# Patient Record
Sex: FEMALE | Race: WHITE | NOT HISPANIC OR LATINO | Employment: UNEMPLOYED | URBAN - METROPOLITAN AREA
[De-identification: names, ages, dates, MRNs, and addresses within clinical notes are randomized per-mention and may not be internally consistent; named-entity substitution may affect disease eponyms.]

---

## 2017-09-22 ENCOUNTER — APPOINTMENT (OUTPATIENT)
Dept: PREADMISSION TESTING | Facility: HOSPITAL | Age: 57
End: 2017-09-22

## 2018-11-27 ENCOUNTER — HOSPITAL ENCOUNTER (EMERGENCY)
Facility: HOSPITAL | Age: 58
Discharge: LEFT BEFORE DISCHARGE | End: 2018-11-27
Attending: EMERGENCY MEDICINE | Admitting: EMERGENCY MEDICINE

## 2018-11-27 ENCOUNTER — APPOINTMENT (OUTPATIENT)
Dept: GENERAL RADIOLOGY | Facility: HOSPITAL | Age: 58
End: 2018-11-27

## 2018-11-27 ENCOUNTER — APPOINTMENT (OUTPATIENT)
Dept: CT IMAGING | Facility: HOSPITAL | Age: 58
End: 2018-11-27

## 2018-11-27 VITALS
OXYGEN SATURATION: 92 % | HEART RATE: 96 BPM | TEMPERATURE: 97.8 F | RESPIRATION RATE: 19 BRPM | SYSTOLIC BLOOD PRESSURE: 124 MMHG | WEIGHT: 210 LBS | BODY MASS INDEX: 34.99 KG/M2 | DIASTOLIC BLOOD PRESSURE: 68 MMHG | HEIGHT: 65 IN

## 2018-11-27 DIAGNOSIS — R07.9 CHEST PAIN, UNSPECIFIED TYPE: Primary | ICD-10-CM

## 2018-11-27 LAB
ALBUMIN SERPL-MCNC: 4.4 G/DL (ref 3.5–5.2)
ALBUMIN/GLOB SERPL: 1.3 G/DL
ALP SERPL-CCNC: 111 U/L (ref 39–117)
ALT SERPL W P-5'-P-CCNC: 19 U/L (ref 1–33)
ANION GAP SERPL CALCULATED.3IONS-SCNC: 9.5 MMOL/L
AST SERPL-CCNC: 21 U/L (ref 1–32)
BASOPHILS # BLD AUTO: 0.04 10*3/MM3 (ref 0–0.2)
BASOPHILS NFR BLD AUTO: 0.4 % (ref 0–1.5)
BILIRUB SERPL-MCNC: 0.3 MG/DL (ref 0.1–1.2)
BUN BLD-MCNC: 9 MG/DL (ref 6–20)
BUN/CREAT SERPL: 11.7 (ref 7–25)
CALCIUM SPEC-SCNC: 9.5 MG/DL (ref 8.6–10.5)
CHLORIDE SERPL-SCNC: 98 MMOL/L (ref 98–107)
CO2 SERPL-SCNC: 30.5 MMOL/L (ref 22–29)
CREAT BLD-MCNC: 0.77 MG/DL (ref 0.57–1)
DEPRECATED RDW RBC AUTO: 50.5 FL (ref 37–54)
EOSINOPHIL # BLD AUTO: 0.28 10*3/MM3 (ref 0–0.7)
EOSINOPHIL NFR BLD AUTO: 2.5 % (ref 0.3–6.2)
ERYTHROCYTE [DISTWIDTH] IN BLOOD BY AUTOMATED COUNT: 13.6 % (ref 11.7–13)
GFR SERPL CREATININE-BSD FRML MDRD: 77 ML/MIN/1.73
GLOBULIN UR ELPH-MCNC: 3.4 GM/DL
GLUCOSE BLD-MCNC: 105 MG/DL (ref 65–99)
HCT VFR BLD AUTO: 47.2 % (ref 35.6–45.5)
HGB BLD-MCNC: 15 G/DL (ref 11.9–15.5)
IMM GRANULOCYTES # BLD: 0.04 10*3/MM3 (ref 0–0.03)
IMM GRANULOCYTES NFR BLD: 0.4 % (ref 0–0.5)
LYMPHOCYTES # BLD AUTO: 4.87 10*3/MM3 (ref 0.9–4.8)
LYMPHOCYTES NFR BLD AUTO: 44.2 % (ref 19.6–45.3)
MCH RBC QN AUTO: 31.8 PG (ref 26.9–32)
MCHC RBC AUTO-ENTMCNC: 31.8 G/DL (ref 32.4–36.3)
MCV RBC AUTO: 100 FL (ref 80.5–98.2)
MONOCYTES # BLD AUTO: 0.7 10*3/MM3 (ref 0.2–1.2)
MONOCYTES NFR BLD AUTO: 6.4 % (ref 5–12)
NEUTROPHILS # BLD AUTO: 5.08 10*3/MM3 (ref 1.9–8.1)
NEUTROPHILS NFR BLD AUTO: 46.1 % (ref 42.7–76)
NT-PROBNP SERPL-MCNC: 15.3 PG/ML (ref 0–900)
PLATELET # BLD AUTO: 202 10*3/MM3 (ref 140–500)
PMV BLD AUTO: 8.8 FL (ref 6–12)
POTASSIUM BLD-SCNC: 4.4 MMOL/L (ref 3.5–5.2)
PROT SERPL-MCNC: 7.8 G/DL (ref 6–8.5)
RBC # BLD AUTO: 4.72 10*6/MM3 (ref 3.9–5.2)
SODIUM BLD-SCNC: 138 MMOL/L (ref 136–145)
TROPONIN T SERPL-MCNC: <0.01 NG/ML (ref 0–0.03)
TROPONIN T SERPL-MCNC: <0.01 NG/ML (ref 0–0.03)
WBC NRBC COR # BLD: 11.01 10*3/MM3 (ref 4.5–10.7)

## 2018-11-27 PROCEDURE — 99284 EMERGENCY DEPT VISIT MOD MDM: CPT

## 2018-11-27 PROCEDURE — 83880 ASSAY OF NATRIURETIC PEPTIDE: CPT | Performed by: NURSE PRACTITIONER

## 2018-11-27 PROCEDURE — 80053 COMPREHEN METABOLIC PANEL: CPT | Performed by: NURSE PRACTITIONER

## 2018-11-27 PROCEDURE — 94640 AIRWAY INHALATION TREATMENT: CPT

## 2018-11-27 PROCEDURE — 93005 ELECTROCARDIOGRAM TRACING: CPT | Performed by: EMERGENCY MEDICINE

## 2018-11-27 PROCEDURE — 93005 ELECTROCARDIOGRAM TRACING: CPT

## 2018-11-27 PROCEDURE — 84484 ASSAY OF TROPONIN QUANT: CPT | Performed by: NURSE PRACTITIONER

## 2018-11-27 PROCEDURE — 71045 X-RAY EXAM CHEST 1 VIEW: CPT

## 2018-11-27 PROCEDURE — 93010 ELECTROCARDIOGRAM REPORT: CPT | Performed by: INTERNAL MEDICINE

## 2018-11-27 PROCEDURE — 85025 COMPLETE CBC W/AUTO DIFF WBC: CPT | Performed by: NURSE PRACTITIONER

## 2018-11-27 RX ORDER — HYDROCODONE BITARTRATE AND ACETAMINOPHEN 10; 325 MG/1; MG/1
10-325 TABLET ORAL EVERY 6 HOURS SCHEDULED
COMMUNITY

## 2018-11-27 RX ORDER — CLONAZEPAM 0.5 MG/1
TABLET ORAL
COMMUNITY

## 2018-11-27 RX ORDER — ALBUTEROL SULFATE 90 UG/1
2 AEROSOL, METERED RESPIRATORY (INHALATION)
COMMUNITY
Start: 2018-06-11

## 2018-11-27 RX ORDER — ALBUTEROL SULFATE 2.5 MG/3ML
SOLUTION RESPIRATORY (INHALATION)
COMMUNITY
Start: 2018-06-11

## 2018-11-27 RX ORDER — IPRATROPIUM BROMIDE AND ALBUTEROL SULFATE 2.5; .5 MG/3ML; MG/3ML
3 SOLUTION RESPIRATORY (INHALATION) ONCE
Status: COMPLETED | OUTPATIENT
Start: 2018-11-27 | End: 2018-11-27

## 2018-11-27 RX ORDER — BUDESONIDE AND FORMOTEROL FUMARATE DIHYDRATE 160; 4.5 UG/1; UG/1
2 AEROSOL RESPIRATORY (INHALATION)
COMMUNITY
Start: 2018-11-13 | End: 2019-11-13

## 2018-11-27 RX ORDER — CYCLOBENZAPRINE HCL 10 MG
10 TABLET ORAL EVERY 8 HOURS SCHEDULED
COMMUNITY
Start: 2018-09-28

## 2018-11-27 RX ADMIN — IPRATROPIUM BROMIDE AND ALBUTEROL SULFATE 3 ML: 2.5; .5 SOLUTION RESPIRATORY (INHALATION) at 12:53

## 2018-11-27 NOTE — ED PROVIDER NOTES
"EMERGENCY DEPARTMENT ENCOUNTER    CHIEF COMPLAINT  Chief Complaint: Chest Pain  History given by: Patient  History limited by: N/A  Room Number: 26/26  PMD: System, Provider Not In      HPI:  Pt is a 58 y.o. female with h/o chronic back pain for which pt is followed by Pain Management. Pt reports that earlier today, while pt was in the waiting room of her Pain Management physician's office (for an appointment) reading a book, pt developed constant \"sharp\" left-sided chest pain. Pt reports that she has taken NTG with some relief of the chest pain. Pt reports that her chest pain has now resolved. Pt reports that during its onset, pt's chest pain did not worsen with exertion, movement, or inspiration. Pt reports that she has also had mild nausea and mild dyspnea, but denies palpitations, diaphoresis, vomiting, abdominal pain, bilateral/unilateral calf pain, BLE edema, and dizziness/lightheadedness. Pt states that she had a similar episode of chest pain several months ago for which pt underwent an outpatient stress test (pt is unable to recall the results of the stress test). There are no other complaints at this time.       Duration: Onset earlier today  Timing: Constant  Location: Left side of the chest  Radiation: None  Quality: \"sharp\"  Intensity/Severity: Moderate  Progression: Resolved  Associated Symptoms: Dyspnea, nausea  Aggravating Factors: Nothing  Alleviating Factors: NTG  Previous Episodes: Once several months ago  Treatment before arrival: NTG        PAST MEDICAL HISTORY  Active Ambulatory Problems     Diagnosis Date Noted   • No Active Ambulatory Problems     Resolved Ambulatory Problems     Diagnosis Date Noted   • No Resolved Ambulatory Problems     Past Medical History:   Diagnosis Date   • Back pain    • Chronic pain    • COPD (chronic obstructive pulmonary disease) (CMS/[facility])    • Depression    • Hyperlipidemia    • Injury of back        PAST SURGICAL HISTORY  Past Surgical History:   Procedure " Laterality Date   • APPENDECTOMY     • BREAST SURGERY     • HYSTERECTOMY     • NECK SURGERY     • TONSILLECTOMY     • TUBAL ABDOMINAL LIGATION         FAMILY HISTORY  History reviewed. No pertinent family history.    SOCIAL HISTORY  Social History     Socioeconomic History   • Marital status:      Spouse name: Not on file   • Number of children: Not on file   • Years of education: Not on file   • Highest education level: Not on file   Social Needs   • Financial resource strain: Not on file   • Food insecurity - worry: Not on file   • Food insecurity - inability: Not on file   • Transportation needs - medical: Not on file   • Transportation needs - non-medical: Not on file   Occupational History   • Not on file   Tobacco Use   • Smoking status: Current Every Day Smoker     Packs/day: 1.00     Types: Cigarettes   Substance and Sexual Activity   • Alcohol use: No     Frequency: Never   • Drug use: No   • Sexual activity: Defer   Other Topics Concern   • Not on file   Social History Narrative   • Not on file         ALLERGIES  Patient has no known allergies.        REVIEW OF SYSTEMS  Review of Systems   Constitutional: Negative for chills and diaphoresis.   HENT: Negative for congestion, rhinorrhea and sore throat.    Eyes: Negative for pain.   Respiratory: Positive for shortness of breath. Negative for cough.    Cardiovascular: Positive for chest pain. Negative for palpitations and leg swelling.   Gastrointestinal: Positive for nausea. Negative for abdominal pain, diarrhea and vomiting.   Genitourinary: Negative for difficulty urinating, dysuria, flank pain and frequency.   Musculoskeletal: Positive for back pain (chronic for pt). Negative for myalgias, neck pain and neck stiffness.   Skin: Negative for rash.   Neurological: Negative for dizziness, speech difficulty, weakness, light-headedness, numbness and headaches.   Psychiatric/Behavioral: Negative.    All other systems reviewed and are  negative.          PHYSICAL EXAM  ED Triage Vitals   Temp Heart Rate Resp BP SpO2   11/27/18 1206 11/27/18 1206 11/27/18 1209 11/27/18 1206 11/27/18 1206   97.8 °F (36.6 °C) 82 16 121/66 94 %      Temp src Heart Rate Source Patient Position BP Location FiO2 (%)   11/27/18 1206 -- -- -- --   Oral           Physical Exam   Constitutional: She is oriented to person, place, and time. No distress.   HENT:   Head: Normocephalic.   Mouth/Throat: Mucous membranes are normal.   Eyes: EOM are normal. Pupils are equal, round, and reactive to light.   Neck: Normal range of motion. Neck supple.   Cardiovascular: Normal rate, regular rhythm and normal heart sounds.   Pulmonary/Chest: Effort normal. No respiratory distress. She has decreased breath sounds (bilaterally). She has no wheezes. She has no rhonchi. She has no rales.   Abdominal: Soft. There is no tenderness. There is no rebound and no guarding.   Musculoskeletal: Normal range of motion.   Neurological: She is alert and oriented to person, place, and time. She has normal sensation.   Skin: Skin is warm and dry.   Psychiatric: Mood and affect normal.   Nursing note and vitals reviewed.          LAB RESULTS  Recent Results (from the past 24 hour(s))   Comprehensive Metabolic Panel    Collection Time: 11/27/18 12:33 PM   Result Value Ref Range    Glucose 105 (H) 65 - 99 mg/dL    BUN 9 6 - 20 mg/dL    Creatinine 0.77 0.57 - 1.00 mg/dL    Sodium 138 136 - 145 mmol/L    Potassium 4.4 3.5 - 5.2 mmol/L    Chloride 98 98 - 107 mmol/L    CO2 30.5 (H) 22.0 - 29.0 mmol/L    Calcium 9.5 8.6 - 10.5 mg/dL    Total Protein 7.8 6.0 - 8.5 g/dL    Albumin 4.40 3.50 - 5.20 g/dL    ALT (SGPT) 19 1 - 33 U/L    AST (SGOT) 21 1 - 32 U/L    Alkaline Phosphatase 111 39 - 117 U/L    Total Bilirubin 0.3 0.1 - 1.2 mg/dL    eGFR Non African Amer 77 >60 mL/min/1.73    Globulin 3.4 gm/dL    A/G Ratio 1.3 g/dL    BUN/Creatinine Ratio 11.7 7.0 - 25.0    Anion Gap 9.5 mmol/L   Troponin    Collection Time:  11/27/18 12:33 PM   Result Value Ref Range    Troponin T <0.010 0.000 - 0.030 ng/mL   BNP    Collection Time: 11/27/18 12:33 PM   Result Value Ref Range    proBNP 15.3 0.0 - 900.0 pg/mL   CBC Auto Differential    Collection Time: 11/27/18 12:33 PM   Result Value Ref Range    WBC 11.01 (H) 4.50 - 10.70 10*3/mm3    RBC 4.72 3.90 - 5.20 10*6/mm3    Hemoglobin 15.0 11.9 - 15.5 g/dL    Hematocrit 47.2 (H) 35.6 - 45.5 %    .0 (H) 80.5 - 98.2 fL    MCH 31.8 26.9 - 32.0 pg    MCHC 31.8 (L) 32.4 - 36.3 g/dL    RDW 13.6 (H) 11.7 - 13.0 %    RDW-SD 50.5 37.0 - 54.0 fl    MPV 8.8 6.0 - 12.0 fL    Platelets 202 140 - 500 10*3/mm3    Neutrophil % 46.1 42.7 - 76.0 %    Lymphocyte % 44.2 19.6 - 45.3 %    Monocyte % 6.4 5.0 - 12.0 %    Eosinophil % 2.5 0.3 - 6.2 %    Basophil % 0.4 0.0 - 1.5 %    Immature Grans % 0.4 0.0 - 0.5 %    Neutrophils, Absolute 5.08 1.90 - 8.10 10*3/mm3    Lymphocytes, Absolute 4.87 (H) 0.90 - 4.80 10*3/mm3    Monocytes, Absolute 0.70 0.20 - 1.20 10*3/mm3    Eosinophils, Absolute 0.28 0.00 - 0.70 10*3/mm3    Basophils, Absolute 0.04 0.00 - 0.20 10*3/mm3    Immature Grans, Absolute 0.04 (H) 0.00 - 0.03 10*3/mm3   Troponin    Collection Time: 11/27/18  2:07 PM   Result Value Ref Range    Troponin T <0.010 0.000 - 0.030 ng/mL       I ordered the above labs and reviewed the results.        RADIOLOGY  XR Chest 1 View   Final Result    FINDINGS: The lungs are well-expanded and clear and the heart and hilar  structures appear normal. There is no acute disease.      CT Angiogram Chest With Contrast    (Results Pending)       I ordered the above noted radiological studies and reviewed the images on the PACS system.        EKG    EKG was interpreted by Dr. Santana (ER physician). Please see his note for documentation.         MEDICAL RECORD REVIEW    Pt had an LLE venous doppler performed in 08/2018 that showed:  Conclusions: Left lower extremity with no evidence of deep or superficial vein thrombus.             Pt had a stress test performed in 07/2018 at [facility] that showed:  Summary    1. No evidence of stress induced myocardial ischemia or infarction is    visualized.    2. Normal LV perfusion is visualized.        Summary of LV Function    1. Gated SPECT analysis demonstrates a post stress ejection fraction of    78%.    2. Normal LV systolic function is visualized.        Risk Stratification    1. This is a normal Lexiscan nuclear stress test.    2. This is a low risk study.          PROGRESS AND CONSULTS    12:28 PM:  Blood work and CXR ordered for further evaluation. Duo-neb ordered to treat for pt's dyspnea.     1:22 PM:  CTA Chest ordered for further evaluation.     1:23 PM:  Reviewed pt's history and workup with Dr. Santana (ER physician). After a bedside evaluation, Dr. Santana agrees with the plan of care.    2:00 PM:  Repeat troponin ordered for further evaluation.     2:35 PM:  Per Dr. Santana, pt would like to leave Against Medical Advice. Dr. Santana informed pt of the risks of leaving prior to completion of evaluation. Pt verbally agreed to accept these risks, including worsening of condition, complications, and death. Pt was of sound mind and mentally stable to make decisions at the time of her AMA departure. Dr. Santana strongly encouraged pt to return to the ED at any time if symptoms persist or for any additional concerns. Strict RTER warnings given. Pt was strongly advised to f/u with her PMD.     Pt's CTA Chest results are pending.         COURSE & MEDICAL DECISION MAKING  Pertinent Labs and/or imaging studies that were ordered and reviewed are noted above. Results were reviewed/discussed with the patient and they were also made aware of online assess. Pt also made aware that some labs, such as cultures, will not be resulted during ER visit and follow up with PMD is necessary.         MEDICATIONS GIVEN IN ER  Medications   ipratropium-albuterol (DUO-NEB) nebulizer solution 3 mL (3 mL  "Nebulization Given 11/27/18 1253)             DISPOSITION VITALS  /68 (BP Location: Left arm, Patient Position: Lying)   Pulse 96   Temp 97.8 °F (36.6 °C) (Oral)   Resp 19   Ht 165.1 cm (65\")   Wt 95.3 kg (210 lb)   SpO2 92%   BMI 34.95 kg/m²           DIAGNOSIS  Final diagnoses:   Chest pain, unspecified type             DISPOSITION  Pt will leave AMA.       FOLLOW-UP  System, Provider Not In  [place] [id number]      Call for Appointment          I personally reviewed the past medical history, past surgical history, social history, family history, current medications and allergies as they appear in this chart.  The scribe's note accurately reflects the work and decisions made by me.     Documentation assistance provided by kortney Anne for BEVERLY Xiao.  Information recorded by the scribe was done at my direction and has been verified and validated by me.              David Anne  11/27/18 1440       Shannon Burnham APRN  11/27/18 1511    "

## 2018-11-27 NOTE — ED PROVIDER NOTES
Pt presents to the ED c/o intermittent, 15-20 minute episodes of left anterior chest pain which radiates to her back.  Pt reports her pain today began at her Pain Management office while at rest.  Pt reports hx of similar chest pain and recent outpatient stress test.  Pt denies current CP.    On exam, pt's heart is RRR and lungs are CTAB.     EKG          EKG time: 1214  Rhythm/Rate: SR 81  P waves and KS: normal  QRS, axis: normal   ST and T waves: normal     Interpreted Contemporaneously by me, independently viewed  No prior to compare.    Initial troponin negative. Plan for repeat troponin and await CTA Chest.    1428- Pt wanting to return to Pain Management to get her prescription filled. Discussed with pt since her pain is intermittent and ongoing and initial troponin is negative, there is low suspicion, but cannot completely r/o MI and PE.  Pt verbalizes understandings of risks of MI, PE, and death, and will sign out AMA.    MD ATTESTATION NOTE    The NAVID and I have discussed this patient's history, physical exam, and treatment plan.  I have reviewed the documentation and personally had a face to face interaction with the patient. I affirm the documentation and agree with the treatment and plan.  The attached note describes my personal findings.    Documentation assistance provided by kortney Reeder for Dr. Santana. Information recorded by the kortney was done at my direction and has been verified and validated by me.          Kelin Reeder  11/27/18 1439       Rafael Santana MD  11/27/18 1443

## 2018-11-27 NOTE — ED NOTES
Pt was at a F/U doctor's appointment today for back pain. At the office she began having left sided CP that radiated into her epigastric region. Pt has had these episodes in the past. She has been cardiac cleared.      Tiffani Saldana RN  11/27/18 [id number]

## 2020-07-13 ENCOUNTER — TRANSCRIBE ORDERS (OUTPATIENT)
Dept: PHYSICAL THERAPY | Facility: CLINIC | Age: 60
End: 2020-07-13

## 2020-07-13 DIAGNOSIS — M54.16 LUMBAR RADICULOPATHY: Primary | ICD-10-CM

## 2023-03-16 ENCOUNTER — OFFICE (AMBULATORY)
Dept: URBAN - METROPOLITAN AREA CLINIC 76 | Facility: CLINIC | Age: 63
End: 2023-03-16

## 2023-03-16 VITALS
HEIGHT: 60 IN | SYSTOLIC BLOOD PRESSURE: 123 MMHG | WEIGHT: 175 LBS | DIASTOLIC BLOOD PRESSURE: 74 MMHG | HEART RATE: 91 BPM

## 2023-03-16 DIAGNOSIS — R11.0 NAUSEA: ICD-10-CM

## 2023-03-16 DIAGNOSIS — R13.10 DYSPHAGIA, UNSPECIFIED: ICD-10-CM

## 2023-03-16 DIAGNOSIS — K59.03 DRUG INDUCED CONSTIPATION: ICD-10-CM

## 2023-03-16 DIAGNOSIS — K21.9 GASTRO-ESOPHAGEAL REFLUX DISEASE WITHOUT ESOPHAGITIS: ICD-10-CM

## 2023-03-16 DIAGNOSIS — Z86.010 PERSONAL HISTORY OF COLONIC POLYPS: ICD-10-CM

## 2023-03-16 PROCEDURE — 99204 OFFICE O/P NEW MOD 45 MIN: CPT | Performed by: INTERNAL MEDICINE

## 2023-03-16 RX ORDER — NALOXEGOL OXALATE 12.5 MG/1
12.5 TABLET, FILM COATED ORAL
Qty: 90 | Refills: 0 | Status: COMPLETED
Start: 2023-03-16 | End: 2023-04-25

## 2023-04-20 VITALS
RESPIRATION RATE: 18 BRPM | DIASTOLIC BLOOD PRESSURE: 66 MMHG | OXYGEN SATURATION: 92 % | TEMPERATURE: 97.6 F | DIASTOLIC BLOOD PRESSURE: 54 MMHG | RESPIRATION RATE: 15 BRPM | WEIGHT: 168 LBS | RESPIRATION RATE: 8 BRPM | RESPIRATION RATE: 14 BRPM | DIASTOLIC BLOOD PRESSURE: 52 MMHG | DIASTOLIC BLOOD PRESSURE: 46 MMHG | HEART RATE: 69 BPM | SYSTOLIC BLOOD PRESSURE: 108 MMHG | TEMPERATURE: 97.9 F | RESPIRATION RATE: 6 BRPM | HEART RATE: 82 BPM | RESPIRATION RATE: 17 BRPM | TEMPERATURE: 97.8 F | SYSTOLIC BLOOD PRESSURE: 109 MMHG | OXYGEN SATURATION: 93 % | OXYGEN SATURATION: 95 % | OXYGEN SATURATION: 96 % | HEART RATE: 73 BPM | OXYGEN SATURATION: 90 % | DIASTOLIC BLOOD PRESSURE: 37 MMHG | SYSTOLIC BLOOD PRESSURE: 97 MMHG | HEART RATE: 68 BPM | RESPIRATION RATE: 7 BRPM | DIASTOLIC BLOOD PRESSURE: 58 MMHG | SYSTOLIC BLOOD PRESSURE: 120 MMHG | RESPIRATION RATE: 19 BRPM | HEART RATE: 77 BPM | HEIGHT: 60 IN | HEART RATE: 71 BPM | DIASTOLIC BLOOD PRESSURE: 41 MMHG | OXYGEN SATURATION: 94 % | DIASTOLIC BLOOD PRESSURE: 72 MMHG | SYSTOLIC BLOOD PRESSURE: 99 MMHG | SYSTOLIC BLOOD PRESSURE: 121 MMHG | SYSTOLIC BLOOD PRESSURE: 100 MMHG | SYSTOLIC BLOOD PRESSURE: 95 MMHG

## 2023-04-25 ENCOUNTER — OFFICE (AMBULATORY)
Dept: URBAN - METROPOLITAN AREA PATHOLOGY 4 | Facility: PATHOLOGY | Age: 63
End: 2023-04-25
Payer: MEDICAID

## 2023-04-25 ENCOUNTER — AMBULATORY SURGICAL CENTER (AMBULATORY)
Dept: URBAN - METROPOLITAN AREA SURGERY 17 | Facility: SURGERY | Age: 63
End: 2023-04-25

## 2023-04-25 DIAGNOSIS — K29.80 DUODENITIS WITHOUT BLEEDING: ICD-10-CM

## 2023-04-25 DIAGNOSIS — K29.70 GASTRITIS, UNSPECIFIED, WITHOUT BLEEDING: ICD-10-CM

## 2023-04-25 DIAGNOSIS — K20.80 OTHER ESOPHAGITIS WITHOUT BLEEDING: ICD-10-CM

## 2023-04-25 DIAGNOSIS — K20.90 ESOPHAGITIS, UNSPECIFIED WITHOUT BLEEDING: ICD-10-CM

## 2023-04-25 DIAGNOSIS — R13.10 DYSPHAGIA, UNSPECIFIED: ICD-10-CM

## 2023-04-25 DIAGNOSIS — K31.89 OTHER DISEASES OF STOMACH AND DUODENUM: ICD-10-CM

## 2023-04-25 LAB
GI HISTOLOGY: A. UNSPECIFIED: (no result)
GI HISTOLOGY: B. UNSPECIFIED: (no result)
GI HISTOLOGY: C. SELECT: (no result)
GI HISTOLOGY: D. SELECT: (no result)
GI HISTOLOGY: E. SELECT: (no result)
GI HISTOLOGY: F. SELECT: (no result)
GI HISTOLOGY: PDF REPORT: (no result)

## 2023-04-25 PROCEDURE — 43239 EGD BIOPSY SINGLE/MULTIPLE: CPT | Performed by: INTERNAL MEDICINE

## 2023-04-25 PROCEDURE — 88342 IMHCHEM/IMCYTCHM 1ST ANTB: CPT | Performed by: INTERNAL MEDICINE

## 2023-04-25 PROCEDURE — 88312 SPECIAL STAINS GROUP 1: CPT | Performed by: INTERNAL MEDICINE

## 2023-04-25 PROCEDURE — 43450 DILATE ESOPHAGUS 1/MULT PASS: CPT | Performed by: INTERNAL MEDICINE

## 2023-04-25 PROCEDURE — 88305 TISSUE EXAM BY PATHOLOGIST: CPT | Performed by: INTERNAL MEDICINE

## 2023-09-19 ENCOUNTER — OFFICE (AMBULATORY)
Dept: URBAN - METROPOLITAN AREA CLINIC 76 | Facility: CLINIC | Age: 63
End: 2023-09-19
Payer: MEDICAID

## 2023-09-19 VITALS
HEART RATE: 79 BPM | WEIGHT: 176 LBS | SYSTOLIC BLOOD PRESSURE: 118 MMHG | HEIGHT: 60 IN | DIASTOLIC BLOOD PRESSURE: 62 MMHG | OXYGEN SATURATION: 91 %

## 2023-09-19 DIAGNOSIS — K21.9 GASTRO-ESOPHAGEAL REFLUX DISEASE WITHOUT ESOPHAGITIS: ICD-10-CM

## 2023-09-19 DIAGNOSIS — K58.1 IRRITABLE BOWEL SYNDROME WITH CONSTIPATION: ICD-10-CM

## 2023-09-19 DIAGNOSIS — R11.0 NAUSEA: ICD-10-CM

## 2023-09-19 DIAGNOSIS — R13.10 DYSPHAGIA, UNSPECIFIED: ICD-10-CM

## 2023-09-19 PROCEDURE — 99214 OFFICE O/P EST MOD 30 MIN: CPT

## 2023-09-19 RX ORDER — PLECANATIDE 3 MG/1
3 TABLET ORAL
Qty: 90 | Refills: 3 | Status: ACTIVE
Start: 2023-09-19

## 2023-09-19 RX ORDER — OMEPRAZOLE 40 MG/1
80 CAPSULE, DELAYED RELEASE ORAL
Qty: 180 | Refills: 4 | Status: COMPLETED
Start: 2023-09-19 | End: 2024-04-03

## 2024-01-02 ENCOUNTER — OFFICE (AMBULATORY)
Dept: URBAN - METROPOLITAN AREA CLINIC 76 | Facility: CLINIC | Age: 64
End: 2024-01-02
Payer: MEDICAID

## 2024-01-02 VITALS
WEIGHT: 180 LBS | HEART RATE: 80 BPM | SYSTOLIC BLOOD PRESSURE: 123 MMHG | DIASTOLIC BLOOD PRESSURE: 76 MMHG | HEIGHT: 60 IN

## 2024-01-02 DIAGNOSIS — K58.1 IRRITABLE BOWEL SYNDROME WITH CONSTIPATION: ICD-10-CM

## 2024-01-02 DIAGNOSIS — R13.10 DYSPHAGIA, UNSPECIFIED: ICD-10-CM

## 2024-01-02 DIAGNOSIS — K21.9 GASTRO-ESOPHAGEAL REFLUX DISEASE WITHOUT ESOPHAGITIS: ICD-10-CM

## 2024-01-02 DIAGNOSIS — R11.0 NAUSEA: ICD-10-CM

## 2024-01-02 DIAGNOSIS — R14.0 ABDOMINAL DISTENSION (GASEOUS): ICD-10-CM

## 2024-01-02 PROCEDURE — 99214 OFFICE O/P EST MOD 30 MIN: CPT

## 2024-01-02 RX ORDER — PANTOPRAZOLE SODIUM 40 MG/1
80 TABLET, DELAYED RELEASE ORAL
Qty: 180 | Refills: 3 | Status: ACTIVE
Start: 2024-01-02

## 2024-04-03 ENCOUNTER — OFFICE (AMBULATORY)
Dept: URBAN - METROPOLITAN AREA CLINIC 76 | Facility: CLINIC | Age: 64
End: 2024-04-03
Payer: MEDICAID

## 2024-04-03 VITALS
DIASTOLIC BLOOD PRESSURE: 67 MMHG | HEART RATE: 93 BPM | SYSTOLIC BLOOD PRESSURE: 113 MMHG | HEIGHT: 60 IN | WEIGHT: 177 LBS | OXYGEN SATURATION: 97 %

## 2024-04-03 DIAGNOSIS — R19.7 DIARRHEA, UNSPECIFIED: ICD-10-CM

## 2024-04-03 DIAGNOSIS — R10.9 UNSPECIFIED ABDOMINAL PAIN: ICD-10-CM

## 2024-04-03 DIAGNOSIS — K21.9 GASTRO-ESOPHAGEAL REFLUX DISEASE WITHOUT ESOPHAGITIS: ICD-10-CM

## 2024-04-03 DIAGNOSIS — R14.0 ABDOMINAL DISTENSION (GASEOUS): ICD-10-CM

## 2024-04-03 DIAGNOSIS — R13.10 DYSPHAGIA, UNSPECIFIED: ICD-10-CM

## 2024-04-03 PROCEDURE — 99214 OFFICE O/P EST MOD 30 MIN: CPT

## 2024-04-03 RX ORDER — ESOMEPRAZOLE MAGNESIUM 40 MG/1
80 CAPSULE, DELAYED RELEASE ORAL
Qty: 180 | Refills: 3 | Status: ACTIVE
Start: 2024-04-03

## 2024-04-03 RX ORDER — DICYCLOMINE HYDROCHLORIDE 10 MG/1
40 CAPSULE ORAL
Qty: 60 | Refills: 11 | Status: ACTIVE
Start: 2024-04-03

## 2024-04-17 PROBLEM — T40.2X5A THERAPEUTIC OPIOID INDUCED CONSTIPATION: Status: ACTIVE | Noted: 2024-04-17

## 2024-04-17 PROBLEM — K21.9 GERD (GASTROESOPHAGEAL REFLUX DISEASE): Status: ACTIVE | Noted: 2024-04-17

## 2024-04-17 PROBLEM — K59.03 THERAPEUTIC OPIOID INDUCED CONSTIPATION: Status: ACTIVE | Noted: 2024-04-17

## 2024-04-18 ENCOUNTER — OFFICE VISIT (OUTPATIENT)
Age: 64
End: 2024-04-18
Payer: MEDICARE

## 2024-04-18 ENCOUNTER — TELEPHONE (OUTPATIENT)
Dept: GASTROENTEROLOGY | Facility: CLINIC | Age: 64
End: 2024-04-18
Payer: MEDICARE

## 2024-04-18 VITALS — SYSTOLIC BLOOD PRESSURE: 130 MMHG | BODY MASS INDEX: 40.04 KG/M2 | HEIGHT: 60 IN | DIASTOLIC BLOOD PRESSURE: 80 MMHG

## 2024-04-18 DIAGNOSIS — R11.2 NAUSEA AND VOMITING, UNSPECIFIED VOMITING TYPE: ICD-10-CM

## 2024-04-18 DIAGNOSIS — K21.9 GASTROESOPHAGEAL REFLUX DISEASE, UNSPECIFIED WHETHER ESOPHAGITIS PRESENT: ICD-10-CM

## 2024-04-18 DIAGNOSIS — K52.9 CHRONIC DIARRHEA: ICD-10-CM

## 2024-04-18 DIAGNOSIS — K58.0 IRRITABLE BOWEL SYNDROME WITH DIARRHEA: ICD-10-CM

## 2024-04-18 DIAGNOSIS — R10.13 DYSPEPSIA: Primary | ICD-10-CM

## 2024-04-18 RX ORDER — ONDANSETRON 8 MG/1
8 TABLET, ORALLY DISINTEGRATING ORAL EVERY 8 HOURS PRN
Qty: 90 TABLET | Refills: 11 | Status: SHIPPED | OUTPATIENT
Start: 2024-04-18

## 2024-04-18 RX ORDER — OMEPRAZOLE 40 MG/1
40 CAPSULE, DELAYED RELEASE ORAL
Qty: 180 CAPSULE | Refills: 3 | Status: SHIPPED | OUTPATIENT
Start: 2024-04-18

## 2024-04-18 NOTE — TELEPHONE ENCOUNTER
PA Case: [id number], Status: Approved, Coverage Starts on: 1/18/2024 12:00:00 AM, Coverage Ends on: 4/18/2025 12:00:00 AM.. Authorization Expiration Date: April 18, 2025.    Not eligible for copay card

## 2024-04-18 NOTE — PROGRESS NOTES
Anabelle Nova is a 64 y.o. female with a past medical history of previous Opioid Use DO 2/2 Chronic Pain, GERD + noted below who presents for evaluation of   Chief Complaint   Patient presents with    Heartburn    Constipation    Nausea    Vomiting       Subjective     # Chronic Diarrhea   # Dyspepsia   - Symptoms ongoing for 3 years   - Reports diarrhea occurring anytime she tries to eat.   - Reports anorexia and epigastric discomfort.    - Denies taking any medications  - No previous c/s noted in EMR.   - Took last opioid on 2/20.     # GERD   - Endorses heartburn occurring several days per week.   - Associated with persistent N/V.   - Denies taking any medications currently. Denies NSAID use.                Past Medical History:   Diagnosis Date    Back pain     Chronic pain     COPD (chronic obstructive pulmonary disease)     Depression     Hyperlipidemia     Injury of back          Current Outpatient Medications:     albuterol (PROVENTIL HFA;VENTOLIN HFA) 108 (90 Base) MCG/ACT inhaler, Inhale 2 puffs. (Patient not taking: Reported on 4/18/2024), Disp: , Rfl:     albuterol (PROVENTIL) (2.5 MG/3ML) 0.083% nebulizer solution, albuterol sulfate 2.5 mg/3 mL (0.083 %) solution for nebulization (Patient not taking: Reported on 4/18/2024), Disp: , Rfl:     clonazePAM (KlonoPIN) 0.5 MG tablet, clonazepam 0.5 mg tablet (Patient not taking: Reported on 4/18/2024), Disp: , Rfl:     cyclobenzaprine (FLEXERIL) 10 MG tablet, Take 10 mg by mouth Every 8 (Eight) Hours. (Patient not taking: Reported on 4/18/2024), Disp: , Rfl:     gabapentin (NEURONTIN) 300 MG capsule, , Disp: , Rfl:     GABAPENTIN PO, Take 600 mg by mouth Every Night. (Patient not taking: Reported on 4/18/2024), Disp: , Rfl:     HYDROcodone-acetaminophen (NORCO)  MG per tablet, Take  mg by mouth Every 6 (Six) Hours. (Patient not taking: Reported on 4/18/2024), Disp: , Rfl:     hydrOXYzine pamoate (VISTARIL) 25 MG capsule, hydroxyzine pamoate 25 mg  capsule (Patient not taking: Reported on 4/18/2024), Disp: , Rfl:     linaclotide (Linzess) 290 MCG capsule capsule, Linzess 290 mcg capsule (Patient not taking: Reported on 4/18/2024), Disp: , Rfl:     meloxicam (MOBIC) 15 MG tablet, , Disp: , Rfl:     nystatin (MYCOSTATIN) 100,000 unit/mL suspension, Take 5 mL by mouth 4 (Four) Times a Day. (Patient not taking: Reported on 4/18/2024), Disp: 60 mL, Rfl: 0    omeprazole (priLOSEC) 40 MG capsule, Take 1 capsule by mouth 2 (Two) Times a Day Before Meals., Disp: 180 capsule, Rfl: 3    ondansetron ODT (ZOFRAN-ODT) 8 MG disintegrating tablet, Place 1 tablet on the tongue Every 8 (Eight) Hours As Needed for Nausea or Vomiting., Disp: 90 tablet, Rfl: 11    oxyCODONE-acetaminophen (PERCOCET) 7.5-325 MG per tablet, , Disp: , Rfl:     QUEtiapine (SEROquel) 100 MG tablet, quetiapine 100 mg tablet (Patient not taking: Reported on 4/18/2024), Disp: , Rfl:     Respiratory Therapy Supplies (NEBULIZER COMPRESSOR) kit, 1 each. (Patient not taking: Reported on 4/18/2024), Disp: , Rfl:     riFAXIMin (XIFAXAN) 550 MG tablet, Take 1 tablet by mouth Every 8 (Eight) Hours for 14 days., Disp: 42 tablet, Rfl: 0    sertraline (ZOLOFT) 50 MG tablet, , Disp: , Rfl:     sucralfate (CARAFATE) 1 g tablet, Take 1 tablet by mouth 4 (Four) Times a Day. (Patient not taking: Reported on 4/18/2024), Disp: 30 tablet, Rfl: 0    Allergies   Allergen Reactions    Fluoxetine Other (See Comments)     Homicidal, depressed, confusion  Homicidal, depressed, confusion         Social History     Socioeconomic History    Marital status:    Tobacco Use    Smoking status: Every Day     Current packs/day: 1.00     Types: Cigarettes     Passive exposure: Current    Smokeless tobacco: Never   Vaping Use    Vaping status: Never Used   Substance and Sexual Activity    Alcohol use: No    Drug use: No    Sexual activity: Defer       No family history on file.    Objective     Vitals:    04/18/24 0948   BP: 130/80          04/18/24  0948   Weight: (Patient reported)     Body mass index is 40.04 kg/m².    Physical Exam  Vitals reviewed.   Constitutional:       Appearance: Normal appearance.   HENT:      Head: Normocephalic and atraumatic.   Eyes:      Extraocular Movements: Extraocular movements intact.      Conjunctiva/sclera: Conjunctivae normal.   Cardiovascular:      Rate and Rhythm: Normal rate and regular rhythm.      Heart sounds: Normal heart sounds.   Pulmonary:      Effort: Pulmonary effort is normal.      Breath sounds: Normal breath sounds.   Abdominal:      General: Abdomen is flat. Bowel sounds are normal. There is no distension.      Palpations: Abdomen is soft.      Tenderness: There is abdominal tenderness (mild epigastric).   Neurological:      Mental Status: She is alert.   Psychiatric:         Mood and Affect: Mood normal.         Behavior: Behavior normal.         WBC   Date Value Ref Range Status   09/01/2022 8.99 4.5 - 11.0 10*3/uL Final     RBC   Date Value Ref Range Status   09/01/2022 4.69 4.0 - 5.2 10*6/uL Final     Hemoglobin   Date Value Ref Range Status   09/01/2022 12.9 12.0 - 16.0 g/dL Final     Hematocrit   Date Value Ref Range Status   09/01/2022 42.0 36.0 - 46.0 % Final     MCV   Date Value Ref Range Status   09/01/2022 89.6 80.0 - 100.0 fL Final     MCH   Date Value Ref Range Status   09/01/2022 27.5 26.0 - 34.0 pg Final     MCHC   Date Value Ref Range Status   09/01/2022 30.7 (L) 31.0 - 37.0 g/dL Final     RDW   Date Value Ref Range Status   09/01/2022 17.0 (H) 12.0 - 16.8 % Final     RDW-SD   Date Value Ref Range Status   11/27/2018 50.5 37.0 - 54.0 fl Final     MPV   Date Value Ref Range Status   09/01/2022 9.0 8.4 - 12.4 fL Final     Platelets   Date Value Ref Range Status   09/01/2022 203 140 - 440 10*3/uL Final     Neutrophil Rel %   Date Value Ref Range Status   09/01/2022 53.6 45 - 80 % Final     Lymphocyte Rel %   Date Value Ref Range Status   09/01/2022 35.7 15 - 50 % Final      Monocyte Rel %   Date Value Ref Range Status   09/01/2022 6.6 0 - 15 % Final     Eosinophil %   Date Value Ref Range Status   09/01/2022 2.8 0 - 7 % Final     Basophil Rel %   Date Value Ref Range Status   09/01/2022 0.7 0 - 2 % Final     Immature Grans %   Date Value Ref Range Status   09/01/2022 0.6 0.0 - 1.0 % Final     Neutrophils Absolute   Date Value Ref Range Status   09/06/2022 4.5 1.7 - 6.0 x10(3)/ul Final   09/01/2022 4.83 2.0 - 8.8 10*3/uL Final     Lymphocytes Absolute   Date Value Ref Range Status   09/01/2022 3.21 0.7 - 5.5 10*3/uL Final     Monocytes Absolute   Date Value Ref Range Status   09/01/2022 0.59 0.0 - 1.7 10*3/uL Final     Eosinophils Absolute   Date Value Ref Range Status   09/06/2022 0.2 0.0 - 0.6 x10(3)/ul Final   09/01/2022 0.25 0.0 - 0.8 10*3/uL Final     Basophils Absolute   Date Value Ref Range Status   09/06/2022 0.1 0.0 - 0.3 x10(3)/ul Final   09/01/2022 0.06 0.0 - 0.2 10*3/uL Final     Immature Grans, Absolute   Date Value Ref Range Status   09/01/2022 0.05 0.00 - 0.10 10*3/uL Final     nRBC   Date Value Ref Range Status   10/25/2021 0 0 /100(WBC) Final       Glucose   Date Value Ref Range Status   11/27/2018 105 (H) 65 - 99 mg/dL Final     Sodium   Date Value Ref Range Status   09/01/2022 138 136 - 145 mmol/L Final     Potassium   Date Value Ref Range Status   09/01/2022 4.3 3.5 - 5.1 mmol/L Final     Total CO2   Date Value Ref Range Status   09/01/2022 27 22 - 29 mmol/L Final   10/25/2021 31 (H) 22 - 30 mmol/L Final     Chloride   Date Value Ref Range Status   09/01/2022 102 98 - 107 mmol/L Final     Anion Gap   Date Value Ref Range Status   09/01/2022 9 5 - 13 (arb'U) Final     Comment:     (note)  Calculation- Na - (Cl + CO2)     Creatinine   Date Value Ref Range Status   09/01/2022 0.73 0.55 - 1.02 mg/dL Final     BUN   Date Value Ref Range Status   09/01/2022 5 (L) 10 - 20 mg/dL Final     BUN/Creatinine Ratio   Date Value Ref Range Status   09/01/2022 6.2 RATIO Final      Calcium   Date Value Ref Range Status   09/01/2022 9.2 8.4 - 10.2 mg/dL Final     eGFR Non  Amer   Date Value Ref Range Status   11/27/2018 77 >60 mL/min/1.73 Final     Alkaline Phosphatase   Date Value Ref Range Status   09/01/2022 99 40 - 150 U/L Final     Total Protein   Date Value Ref Range Status   09/01/2022 7.7 6.2 - 8.0 g/dL Final     ALT (SGPT)   Date Value Ref Range Status   09/01/2022 8 (L) 10 - 35 U/L Final     AST (SGOT)   Date Value Ref Range Status   09/01/2022 20 10 - 35 U/L Final     Total Bilirubin   Date Value Ref Range Status   09/01/2022 0.2 0.2 - 1.2 mg/dL Final     Albumin   Date Value Ref Range Status   09/01/2022 4.4 3.2 - 4.6 g/dL Final     Globulin   Date Value Ref Range Status   09/01/2022 3.3 1.5 - 4.5 g/dL Final     A/G Ratio   Date Value Ref Range Status   09/01/2022 1.3 1.1 - 2.5 RATIO Final         Imaging Results (Last 7 Days)       ** No results found for the last 168 hours. **                   Assessment & Plan     Diagnoses and all orders for this visit:    1. Dyspepsia (Primary)  Assessment & Plan:  - Increase to Omeprazole 40 mg BID   - Schedule EGD to further evaluate     Orders:  -     omeprazole (priLOSEC) 40 MG capsule; Take 1 capsule by mouth 2 (Two) Times a Day Before Meals.  Dispense: 180 capsule; Refill: 3  -     Case Request; Standing  -     Obtain Informed Consent; Standing  -     Case Request    2. Chronic diarrhea  Assessment & Plan:  - Obtain fecal calprotectin, pancreatic elastase, celiac panel   - Order KUB to assess for encopresis vs chronic diarrhea     Orders:  -     XR Abdomen KUB; Future  -     Celiac Comprehensive Panel  -     Calprotectin, Fecal - Stool, Per Rectum  -     Pancreatic Elastase, Fecal - Stool, Per Rectum; Future  -     Case Request; Standing  -     Obtain Informed Consent; Standing  -     Case Request    3. Irritable bowel syndrome with diarrhea  Assessment & Plan:  - Start 2 week course of Rifaximin 550 mg TID     Orders:  -      riFAXIMin (XIFAXAN) 550 MG tablet; Take 1 tablet by mouth Every 8 (Eight) Hours for 14 days.  Dispense: 42 tablet; Refill: 0    4. Gastroesophageal reflux disease, unspecified whether esophagitis present  Assessment & Plan:  - Uncontrolled. Start Omeprazole 40 mg BID      5. Nausea and vomiting, unspecified vomiting type  Assessment & Plan:  - Start PRN Zofran 8 mg q8h     Orders:  -     ondansetron ODT (ZOFRAN-ODT) 8 MG disintegrating tablet; Place 1 tablet on the tongue Every 8 (Eight) Hours As Needed for Nausea or Vomiting.  Dispense: 90 tablet; Refill: 11      RTC in 2 months     I have discussed the above plan with the patient.  They verbalize understanding and are in agreement with the plan.  They have been advised to contact the office for any questions, concerns, or changes related to their health.

## 2024-04-21 PROBLEM — R10.13 DYSPEPSIA: Status: ACTIVE | Noted: 2024-04-21

## 2024-04-21 PROBLEM — K52.9 CHRONIC DIARRHEA: Status: ACTIVE | Noted: 2024-04-21

## 2024-04-21 PROBLEM — R11.2 NAUSEA AND VOMITING: Status: ACTIVE | Noted: 2024-04-21

## 2024-04-21 PROBLEM — K58.0 IRRITABLE BOWEL SYNDROME WITH DIARRHEA: Status: ACTIVE | Noted: 2024-04-21

## 2024-04-22 NOTE — ASSESSMENT & PLAN NOTE
- Obtain fecal calprotectin, pancreatic elastase, celiac panel   - Order KUB to assess for encopresis vs chronic diarrhea

## 2024-05-06 ENCOUNTER — HOSPITAL ENCOUNTER (OUTPATIENT)
Facility: SURGERY CENTER | Age: 64
Setting detail: HOSPITAL OUTPATIENT SURGERY
Discharge: HOME | End: 2024-05-06
Attending: STUDENT IN AN ORGANIZED HEALTH CARE EDUCATION/TRAINING PROGRAM | Admitting: STUDENT IN AN ORGANIZED HEALTH CARE EDUCATION/TRAINING PROGRAM
Payer: MEDICARE

## 2024-05-06 ENCOUNTER — ANESTHESIA (OUTPATIENT)
Dept: SURGERY | Facility: SURGERY CENTER | Age: 64
End: 2024-05-06
Payer: MEDICARE

## 2024-05-06 ENCOUNTER — ANESTHESIA EVENT (OUTPATIENT)
Dept: SURGERY | Facility: SURGERY CENTER | Age: 64
End: 2024-05-06
Payer: MEDICARE

## 2024-05-06 VITALS
WEIGHT: 162.8 LBS | RESPIRATION RATE: 16 BRPM | DIASTOLIC BLOOD PRESSURE: 69 MMHG | TEMPERATURE: 98.4 F | HEIGHT: 60 IN | OXYGEN SATURATION: 94 % | BODY MASS INDEX: 31.96 KG/M2 | SYSTOLIC BLOOD PRESSURE: 110 MMHG | HEART RATE: 96 BPM

## 2024-05-06 DIAGNOSIS — K52.9 CHRONIC DIARRHEA: ICD-10-CM

## 2024-05-06 DIAGNOSIS — R10.13 DYSPEPSIA: ICD-10-CM

## 2024-05-06 PROCEDURE — 45380 COLONOSCOPY AND BIOPSY: CPT | Performed by: STUDENT IN AN ORGANIZED HEALTH CARE EDUCATION/TRAINING PROGRAM

## 2024-05-06 PROCEDURE — 25010000002 LIDOCAINE 1 % SOLUTION: Performed by: NURSE ANESTHETIST, CERTIFIED REGISTERED

## 2024-05-06 PROCEDURE — 25010000002 PHENYLEPHRINE 10 MG/ML SOLUTION: Performed by: NURSE ANESTHETIST, CERTIFIED REGISTERED

## 2024-05-06 PROCEDURE — 25010000002 GLYCOPYRROLATE 1 MG/5ML SOLUTION: Performed by: STUDENT IN AN ORGANIZED HEALTH CARE EDUCATION/TRAINING PROGRAM

## 2024-05-06 PROCEDURE — 25010000002 PROPOFOL 10 MG/ML EMULSION: Performed by: NURSE ANESTHETIST, CERTIFIED REGISTERED

## 2024-05-06 PROCEDURE — 25810000003 LACTATED RINGERS PER 1000 ML: Performed by: STUDENT IN AN ORGANIZED HEALTH CARE EDUCATION/TRAINING PROGRAM

## 2024-05-06 PROCEDURE — 43239 EGD BIOPSY SINGLE/MULTIPLE: CPT | Performed by: STUDENT IN AN ORGANIZED HEALTH CARE EDUCATION/TRAINING PROGRAM

## 2024-05-06 PROCEDURE — 88305 TISSUE EXAM BY PATHOLOGIST: CPT | Performed by: STUDENT IN AN ORGANIZED HEALTH CARE EDUCATION/TRAINING PROGRAM

## 2024-05-06 PROCEDURE — 25010000002 PROPOFOL 1000 MG/100ML EMULSION: Performed by: NURSE ANESTHETIST, CERTIFIED REGISTERED

## 2024-05-06 PROCEDURE — 45385 COLONOSCOPY W/LESION REMOVAL: CPT | Performed by: STUDENT IN AN ORGANIZED HEALTH CARE EDUCATION/TRAINING PROGRAM

## 2024-05-06 RX ORDER — SODIUM CHLORIDE 0.9 % (FLUSH) 0.9 %
3-10 SYRINGE (ML) INJECTION AS NEEDED
Status: DISCONTINUED | OUTPATIENT
Start: 2024-05-06 | End: 2024-05-06 | Stop reason: HOSPADM

## 2024-05-06 RX ORDER — FENTANYL CITRATE 50 UG/ML
50 INJECTION, SOLUTION INTRAMUSCULAR; INTRAVENOUS ONCE AS NEEDED
Status: DISCONTINUED | OUTPATIENT
Start: 2024-05-06 | End: 2024-05-06 | Stop reason: HOSPADM

## 2024-05-06 RX ORDER — LIDOCAINE HYDROCHLORIDE 10 MG/ML
0.5 INJECTION, SOLUTION INFILTRATION; PERINEURAL ONCE AS NEEDED
Status: DISCONTINUED | OUTPATIENT
Start: 2024-05-06 | End: 2024-05-06 | Stop reason: HOSPADM

## 2024-05-06 RX ORDER — EPHEDRINE SULFATE 50 MG/ML
INJECTION INTRAVENOUS AS NEEDED
Status: DISCONTINUED | OUTPATIENT
Start: 2024-05-06 | End: 2024-05-06 | Stop reason: SURG

## 2024-05-06 RX ORDER — SODIUM CHLORIDE 0.9 % (FLUSH) 0.9 %
3 SYRINGE (ML) INJECTION EVERY 12 HOURS SCHEDULED
Status: DISCONTINUED | OUTPATIENT
Start: 2024-05-06 | End: 2024-05-06 | Stop reason: HOSPADM

## 2024-05-06 RX ORDER — SODIUM CHLORIDE, SODIUM LACTATE, POTASSIUM CHLORIDE, CALCIUM CHLORIDE 600; 310; 30; 20 MG/100ML; MG/100ML; MG/100ML; MG/100ML
1000 INJECTION, SOLUTION INTRAVENOUS CONTINUOUS
Status: DISCONTINUED | OUTPATIENT
Start: 2024-05-06 | End: 2024-05-06 | Stop reason: HOSPADM

## 2024-05-06 RX ORDER — GLYCOPYRROLATE 0.2 MG/ML
INJECTION INTRAMUSCULAR; INTRAVENOUS AS NEEDED
Status: DISCONTINUED | OUTPATIENT
Start: 2024-05-06 | End: 2024-05-06 | Stop reason: SURG

## 2024-05-06 RX ORDER — SODIUM CHLORIDE, SODIUM LACTATE, POTASSIUM CHLORIDE, CALCIUM CHLORIDE 600; 310; 30; 20 MG/100ML; MG/100ML; MG/100ML; MG/100ML
9 INJECTION, SOLUTION INTRAVENOUS CONTINUOUS
Status: DISCONTINUED | OUTPATIENT
Start: 2024-05-06 | End: 2024-05-06 | Stop reason: HOSPADM

## 2024-05-06 RX ORDER — PROPOFOL 10 MG/ML
INJECTION, EMULSION INTRAVENOUS AS NEEDED
Status: DISCONTINUED | OUTPATIENT
Start: 2024-05-06 | End: 2024-05-06 | Stop reason: SURG

## 2024-05-06 RX ORDER — SODIUM CHLORIDE 0.9 % (FLUSH) 0.9 %
10 SYRINGE (ML) INJECTION AS NEEDED
Status: DISCONTINUED | OUTPATIENT
Start: 2024-05-06 | End: 2024-05-06 | Stop reason: HOSPADM

## 2024-05-06 RX ORDER — LIDOCAINE HYDROCHLORIDE 10 MG/ML
INJECTION, SOLUTION INFILTRATION; PERINEURAL AS NEEDED
Status: DISCONTINUED | OUTPATIENT
Start: 2024-05-06 | End: 2024-05-06 | Stop reason: SURG

## 2024-05-06 RX ORDER — PHENYLEPHRINE HYDROCHLORIDE 10 MG/ML
INJECTION INTRAVENOUS AS NEEDED
Status: DISCONTINUED | OUTPATIENT
Start: 2024-05-06 | End: 2024-05-06 | Stop reason: SURG

## 2024-05-06 RX ADMIN — PROPOFOL 60 MG: 10 INJECTION, EMULSION INTRAVENOUS at 13:17

## 2024-05-06 RX ADMIN — SODIUM CHLORIDE, POTASSIUM CHLORIDE, SODIUM LACTATE AND CALCIUM CHLORIDE 1000 ML: 600; 310; 30; 20 INJECTION, SOLUTION INTRAVENOUS at 12:42

## 2024-05-06 RX ADMIN — LIDOCAINE HYDROCHLORIDE 30 MG: 10 INJECTION, SOLUTION INFILTRATION; PERINEURAL at 13:17

## 2024-05-06 RX ADMIN — PHENYLEPHRINE HYDROCHLORIDE 50 MCG: 10 INJECTION INTRAVENOUS at 13:33

## 2024-05-06 RX ADMIN — EPHEDRINE SULFATE 5 MG: 50 INJECTION, SOLUTION INTRAVENOUS at 13:45

## 2024-05-06 RX ADMIN — PROPOFOL 200 MCG/KG/MIN: 10 INJECTION, EMULSION INTRAVENOUS at 13:17

## 2024-05-06 RX ADMIN — PHENYLEPHRINE HYDROCHLORIDE 100 MCG: 10 INJECTION INTRAVENOUS at 13:28

## 2024-05-06 RX ADMIN — GLYCOPYRROLATE 0.2 MG: 0.2 INJECTION, SOLUTION INTRAMUSCULAR; INTRAVENOUS at 13:36

## 2024-05-07 LAB
LAB AP CASE REPORT: NORMAL
PATH REPORT.FINAL DX SPEC: NORMAL
PATH REPORT.GROSS SPEC: NORMAL

## 2024-06-20 ENCOUNTER — OFFICE VISIT (OUTPATIENT)
Age: 64
End: 2024-06-20
Payer: MEDICARE

## 2024-06-20 VITALS
DIASTOLIC BLOOD PRESSURE: 70 MMHG | HEIGHT: 60 IN | BODY MASS INDEX: 31.8 KG/M2 | SYSTOLIC BLOOD PRESSURE: 124 MMHG | WEIGHT: 162 LBS

## 2024-06-20 DIAGNOSIS — R10.13 DYSPEPSIA: ICD-10-CM

## 2024-06-20 DIAGNOSIS — K58.0 IRRITABLE BOWEL SYNDROME WITH DIARRHEA: Primary | ICD-10-CM

## 2024-06-20 DIAGNOSIS — K21.9 GASTROESOPHAGEAL REFLUX DISEASE, UNSPECIFIED WHETHER ESOPHAGITIS PRESENT: ICD-10-CM

## 2024-06-20 DIAGNOSIS — K42.9 UMBILICAL HERNIA WITHOUT OBSTRUCTION AND WITHOUT GANGRENE: ICD-10-CM

## 2024-06-20 RX ORDER — FLUCONAZOLE 150 MG/1
150 TABLET ORAL DAILY
COMMUNITY
Start: 2024-06-12

## 2024-06-20 NOTE — ASSESSMENT & PLAN NOTE
- Improved   - Treated with 2 week course of Rifaximin in 4/2024   - Reports it is manageable now. Declined starting PRN Bentyl   - Obtain KUB to rule out encopresis at the cause

## 2024-06-20 NOTE — PROGRESS NOTES
Anabelle Nova is a 64 y.o. female with PMH of previous Opioid Use DO 2/2 Chronic Pain, GERD + noted below who presents with   Chief Complaint   Patient presents with    Irritable Bowel Syndrome    Heartburn       Subjective     # IBS-D  - Started on a 2 week course of Rifaximin 550 mg TID in 4/2024 which provided mild to moderate relief.   - Endorses having 3 to 4 bowel movement per day with occasional cramping. Bowel movements are described as loose. Still tends to be the worse after she eats. She feels like her symptoms are manageable now.   - EGD on 5/6 had gastritis (biopsied -- negative for H Pylori), normal duodenum (biopsied -- negative for Celiac disease).   - C/s on 5/6 had a sub-cm TA removed, a sub-cm hyperplastic polyp removed, random colon biopsies were negative for microscopic colitis      # GERD   # Dyspepsia 2/2 Gastritis   - Adherent to Omeprazole 40 mg BID.   - EGD on 5/6 had gastritis (biopsied -- negative for H Pylori), normal duodenum (biopsied -- negative for Celiac disease).   - Denies heartburn or dyspepsia.   - Congratulated patient on her intentional weight loss of 45 lbs through dieting.     # Umbilical Hernia   - Reports noticing an increased bulge from her umbilicus. Denies pain or skin discoloration.   - Requested to be evaluated for further management of the umbilical hernia.               Past Medical History:   Diagnosis Date    Back pain     Cancer     Cervical cancer     years ago    Chronic pain     COPD (chronic obstructive pulmonary disease)     Depression     Hyperlipidemia     Injury of back        Social History     Socioeconomic History    Marital status:    Tobacco Use    Smoking status: Every Day     Current packs/day: 1.00     Types: Cigarettes     Passive exposure: Current    Smokeless tobacco: Never   Vaping Use    Vaping status: Never Used   Substance and Sexual Activity    Alcohol use: No    Drug use: No    Sexual activity: Defer         Current Outpatient  Medications:     fluconazole (DIFLUCAN) 150 MG tablet, Take 1 tablet by mouth Daily., Disp: , Rfl:     omeprazole (priLOSEC) 40 MG capsule, Take 1 capsule by mouth 2 (Two) Times a Day Before Meals., Disp: 180 capsule, Rfl: 3    albuterol (PROVENTIL HFA;VENTOLIN HFA) 108 (90 Base) MCG/ACT inhaler, Inhale 2 puffs. (Patient not taking: Reported on 4/18/2024), Disp: , Rfl:     albuterol (PROVENTIL) (2.5 MG/3ML) 0.083% nebulizer solution, albuterol sulfate 2.5 mg/3 mL (0.083 %) solution for nebulization (Patient not taking: Reported on 4/18/2024), Disp: , Rfl:     ondansetron ODT (ZOFRAN-ODT) 8 MG disintegrating tablet, Place 1 tablet on the tongue Every 8 (Eight) Hours As Needed for Nausea or Vomiting. (Patient not taking: Reported on 6/20/2024), Disp: 90 tablet, Rfl: 11    Respiratory Therapy Supplies (NEBULIZER COMPRESSOR) kit, 1 each. (Patient not taking: Reported on 4/18/2024), Disp: , Rfl:     Objective   Vitals:    06/20/24 1038   BP: 124/70         06/20/24  1038   Weight: 73.5 kg (162 lb) (Patient reported)     Body mass index is 31.64 kg/m².      Physical Exam  Vitals reviewed.   Constitutional:       Appearance: Normal appearance.   HENT:      Head: Normocephalic and atraumatic.   Eyes:      Extraocular Movements: Extraocular movements intact.      Conjunctiva/sclera: Conjunctivae normal.   Cardiovascular:      Rate and Rhythm: Normal rate and regular rhythm.      Heart sounds: Normal heart sounds.   Pulmonary:      Effort: Pulmonary effort is normal.      Breath sounds: Normal breath sounds.   Abdominal:      General: Abdomen is flat. Bowel sounds are normal. There is no distension.      Palpations: Abdomen is soft.      Tenderness: There is no abdominal tenderness.      Comments: Umbilical hernia noted with discoloration and easily reducible    Neurological:      Mental Status: She is alert.   Psychiatric:         Mood and Affect: Mood normal.         Behavior: Behavior normal.         WBC   Date Value Ref  Range Status   06/17/2024 10.59 4.5 - 11.0 10*3/uL Final     RBC   Date Value Ref Range Status   06/17/2024 4.99 4.0 - 5.2 10*6/uL Final     Hemoglobin   Date Value Ref Range Status   06/17/2024 14.8 12.0 - 16.0 g/dL Final     Hematocrit   Date Value Ref Range Status   06/17/2024 44.9 36.0 - 46.0 % Final     MCV   Date Value Ref Range Status   06/17/2024 90.0 80.0 - 100.0 fL Final     MCH   Date Value Ref Range Status   06/17/2024 29.7 26.0 - 34.0 pg Final     MCHC   Date Value Ref Range Status   06/17/2024 33.0 31.0 - 37.0 g/dL Final     RDW   Date Value Ref Range Status   06/17/2024 14.8 12.0 - 16.8 % Final     RDW-SD   Date Value Ref Range Status   11/27/2018 50.5 37.0 - 54.0 fl Final     MPV   Date Value Ref Range Status   06/17/2024 9.1 8.4 - 12.4 fL Final     Platelets   Date Value Ref Range Status   06/17/2024 254 140 - 440 10*3/uL Final     Neutrophil Rel %   Date Value Ref Range Status   06/17/2024 46.9 45 - 80 % Final     Lymphocyte Rel %   Date Value Ref Range Status   06/17/2024 41.7 15 - 50 % Final     Monocyte Rel %   Date Value Ref Range Status   06/17/2024 7.1 0 - 15 % Final     Eosinophil %   Date Value Ref Range Status   06/17/2024 3.1 0 - 7 % Final     Basophil Rel %   Date Value Ref Range Status   06/17/2024 0.8 0 - 2 % Final     Immature Grans %   Date Value Ref Range Status   06/17/2024 0.4 0.0 - 1.0 % Final     Neutrophils Absolute   Date Value Ref Range Status   06/17/2024 4.96 2.0 - 8.8 10*3/uL Final     Lymphocytes Absolute   Date Value Ref Range Status   06/17/2024 4.42 0.7 - 5.5 10*3/uL Final     Monocytes Absolute   Date Value Ref Range Status   06/17/2024 0.75 0.0 - 1.7 10*3/uL Final     Eosinophils Absolute   Date Value Ref Range Status   06/17/2024 0.33 0.0 - 0.8 10*3/uL Final     Basophils Absolute   Date Value Ref Range Status   06/17/2024 0.09 0.0 - 0.2 10*3/uL Final     Immature Grans, Absolute   Date Value Ref Range Status   06/17/2024 0.04 0.00 - 0.10 10*3/uL Final     nRBC    Date Value Ref Range Status   10/25/2021 0 0 /100(WBC) Final       Lab Results   Component Value Date    GLUCOSE 105 (H) 11/27/2018    BUN 5 (L) 09/01/2022    CREATININE 0.73 09/01/2022    EGFRIFNONA 77 11/27/2018    EGFRIFAFRI >60 09/01/2022    BCR 6.2 09/01/2022    CO2 27 09/01/2022    CALCIUM 9.2 09/01/2022    ALBUMIN 4.4 09/01/2022    LABIL2 1.3 09/01/2022    AST 20 09/01/2022    ALT 8 (L) 09/01/2022         Imaging Results (Last 7 Days)       ** No results found for the last 168 hours. **              Assessment & Plan   Diagnoses and all orders for this visit:    1. Irritable bowel syndrome with diarrhea (Primary)  Assessment & Plan:  - Improved   - Treated with 2 week course of Rifaximin in 4/2024   - Reports it is manageable now. Declined starting PRN Bentyl   - Obtain KUB to rule out encopresis at the cause     Orders:  -     XR Abdomen KUB; Future    2. Gastroesophageal reflux disease, unspecified whether esophagitis present  Assessment & Plan:  - Controlled. Continue Omeprazole 40 mg BID       3. Dyspepsia  Assessment & Plan:  - Controlled. Continue Omeprazole 40 mg BID       4. Umbilical hernia without obstruction and without gangrene  -     Ambulatory Referral to General Surgery      RTC in 6 months     I have discussed the above plan with the patient.  They verbalize understanding and are in agreement with the plan.  They have been advised to contact the office for any questions, concerns, or changes related to their health.

## 2024-08-05 ENCOUNTER — OFFICE VISIT (OUTPATIENT)
Dept: SURGERY | Facility: CLINIC | Age: 64
End: 2024-08-05
Payer: MEDICARE

## 2024-08-05 VITALS
BODY MASS INDEX: 32.86 KG/M2 | WEIGHT: 167.4 LBS | HEIGHT: 60 IN | DIASTOLIC BLOOD PRESSURE: 78 MMHG | SYSTOLIC BLOOD PRESSURE: 102 MMHG

## 2024-08-05 DIAGNOSIS — K42.9 UMBILICAL HERNIA WITHOUT OBSTRUCTION AND WITHOUT GANGRENE: Primary | ICD-10-CM

## 2024-08-05 PROBLEM — E66.811 OBESITY (BMI 30.0-34.9): Status: ACTIVE | Noted: 2024-08-05

## 2024-08-05 PROBLEM — E66.9 OBESITY (BMI 30.0-34.9): Status: ACTIVE | Noted: 2024-08-05

## 2024-08-05 PROCEDURE — 1159F MED LIST DOCD IN RCRD: CPT | Performed by: SURGERY

## 2024-08-05 PROCEDURE — 99203 OFFICE O/P NEW LOW 30 MIN: CPT | Performed by: SURGERY

## 2024-08-05 PROCEDURE — 1160F RVW MEDS BY RX/DR IN RCRD: CPT | Performed by: SURGERY

## 2024-08-05 RX ORDER — SODIUM CHLORIDE 0.9 % (FLUSH) 0.9 %
10 SYRINGE (ML) INJECTION AS NEEDED
OUTPATIENT
Start: 2024-08-05

## 2024-08-05 RX ORDER — TROSPIUM CHLORIDE ER 60 MG/1
60 CAPSULE ORAL DAILY
COMMUNITY
Start: 2024-06-21

## 2024-08-05 RX ORDER — SODIUM CHLORIDE 0.9 % (FLUSH) 0.9 %
10 SYRINGE (ML) INJECTION EVERY 12 HOURS SCHEDULED
OUTPATIENT
Start: 2024-08-05

## 2024-08-05 RX ORDER — SODIUM CHLORIDE 9 MG/ML
40 INJECTION, SOLUTION INTRAVENOUS AS NEEDED
OUTPATIENT
Start: 2024-08-05

## 2024-08-05 RX ORDER — ROSUVASTATIN CALCIUM 10 MG/1
10 TABLET, COATED ORAL DAILY
COMMUNITY
Start: 2024-07-03 | End: 2025-07-03

## 2024-08-05 NOTE — PROGRESS NOTES
Cc: Umbilical hernia    History of presenting illness:   This is a quite nice, reasonably healthy 64-year-old obese female states that she has noticed an umbilical hernia for the last couple of years.  She lost about 50 pounds earlier this year and since then the hernias been more prominent.  There is mild pain, particularly with direct pressure.  No obstructive symptoms.    Past Medical History: Obesity, back pain, tobacco abuse, COPD, cervical cancer    Past Surgical History: Colonoscopy and EGD most recently May 2024, remote appendectomy in the 1960s, laparoscopic hysterectomy around 2003, patient states no radiation at that time.  Spinal cord stimulator placement.    Medications: Albuterol, omeprazole, Crestor    Allergies: Prozac    Social History: She is an ongoing cigarette smoker about half pack per day and has been trying to cut down    Family History: Negative for colorectal cancer    Review of Systems:  Constitutional: Denies fever, chills, positive for weight loss  Neck: no swollen glands or dysphagia or odynophagia  Respiratory: negative for SOB, cough, hemoptysis or wheezing  Cardiovascular: negative for chest pain, palpitations or peripheral edema  Gastrointestinal: Positive for mild periumbilical pain, denies change in bowel habits, nausea, vomiting      Physical Exam:  BMI: 32.7, with weight 167 pounds today  General: alert and oriented, appropriate, no acute distress  Eyes: No scleral icterus, extraocular movements are intact  Neck: Supple without lymphadenopathy or thyromegaly, trachea is in the midline  Respiratory: There is good bilateral chest expansion, no use of accessory muscles is noted  Cardiovascular: No jugular venous distention or peripheral edema is seen  Gastrointestinal: Obese but soft.  There is a reducible umbilical hernia with a fascial defect of about 1 to 1.5 cm.  Mild tenderness with reduction of the hernia.    Laboratory data: Labs from June 2024 unremarkable with normal  creatinine, hemoglobin normal and hemoglobin A1c of 5.8.    Imaging data: No recent relevant data, but CT read from [facility] in 2021 did mention a fat-containing umbilical hernia      Assessment and plan:   -Initial symptomatic and reducible umbilical hernia  -Options discussed with patient.  I recommended proceeding with open umbilical hernia repair, possible mesh placement depending on findings.  It may be that the fascial opening is small enough that it is not indicated.  We discussed the risk including bleeding, infection, recurrence, pain and injury to surrounding structures.  Patient is agreeable to proceed as outlined.      Bradley Purcell MD, FACS  General, Minimally Invasive and Endoscopic Surgery  [facility]    [address], [address]  [place], [place], [address]  P: [phone]  F: [phone]     BMI is >= 30 and <35. (Class 1 Obesity). The following options were offered after discussion;: information on healthy weight added to patient's after visit summary

## 2024-08-30 ENCOUNTER — PRE-ADMISSION TESTING (OUTPATIENT)
Dept: PREADMISSION TESTING | Facility: HOSPITAL | Age: 64
End: 2024-08-30
Payer: MEDICARE

## 2024-08-30 VITALS
DIASTOLIC BLOOD PRESSURE: 82 MMHG | HEART RATE: 75 BPM | RESPIRATION RATE: 16 BRPM | OXYGEN SATURATION: 95 % | TEMPERATURE: 98.2 F | SYSTOLIC BLOOD PRESSURE: 129 MMHG | WEIGHT: 167 LBS | BODY MASS INDEX: 32.79 KG/M2 | HEIGHT: 60 IN

## 2024-08-30 DIAGNOSIS — K42.9 UMBILICAL HERNIA WITHOUT OBSTRUCTION AND WITHOUT GANGRENE: ICD-10-CM

## 2024-08-30 LAB
ANION GAP SERPL CALCULATED.3IONS-SCNC: 10.1 MMOL/L (ref 5–15)
BUN SERPL-MCNC: 7 MG/DL (ref 8–23)
BUN/CREAT SERPL: 9.6 (ref 7–25)
CALCIUM SPEC-SCNC: 9.5 MG/DL (ref 8.6–10.5)
CHLORIDE SERPL-SCNC: 105 MMOL/L (ref 98–107)
CO2 SERPL-SCNC: 26.9 MMOL/L (ref 22–29)
CREAT SERPL-MCNC: 0.73 MG/DL (ref 0.57–1)
DEPRECATED RDW RBC AUTO: 46.9 FL (ref 37–54)
EGFRCR SERPLBLD CKD-EPI 2021: 91.4 ML/MIN/1.73
ERYTHROCYTE [DISTWIDTH] IN BLOOD BY AUTOMATED COUNT: 13.7 % (ref 12.3–15.4)
GLUCOSE SERPL-MCNC: 104 MG/DL (ref 65–99)
HCT VFR BLD AUTO: 41.2 % (ref 34–46.6)
HGB BLD-MCNC: 13.9 G/DL (ref 12–15.9)
MCH RBC QN AUTO: 31.7 PG (ref 26.6–33)
MCHC RBC AUTO-ENTMCNC: 33.7 G/DL (ref 31.5–35.7)
MCV RBC AUTO: 93.8 FL (ref 79–97)
PLATELET # BLD AUTO: 187 10*3/MM3 (ref 140–450)
PMV BLD AUTO: 9.4 FL (ref 6–12)
POTASSIUM SERPL-SCNC: 3.7 MMOL/L (ref 3.5–5.2)
QT INTERVAL: 391 MS
QTC INTERVAL: 416 MS
RBC # BLD AUTO: 4.39 10*6/MM3 (ref 3.77–5.28)
SODIUM SERPL-SCNC: 142 MMOL/L (ref 136–145)
WBC NRBC COR # BLD AUTO: 7.26 10*3/MM3 (ref 3.4–10.8)

## 2024-08-30 PROCEDURE — 93010 ELECTROCARDIOGRAM REPORT: CPT | Performed by: INTERNAL MEDICINE

## 2024-08-30 PROCEDURE — 93005 ELECTROCARDIOGRAM TRACING: CPT

## 2024-08-30 PROCEDURE — 85027 COMPLETE CBC AUTOMATED: CPT

## 2024-08-30 PROCEDURE — 36415 COLL VENOUS BLD VENIPUNCTURE: CPT

## 2024-08-30 PROCEDURE — 80048 BASIC METABOLIC PNL TOTAL CA: CPT

## 2024-08-30 RX ORDER — MIRABEGRON 50 MG/1
50 TABLET, EXTENDED RELEASE ORAL DAILY
COMMUNITY

## 2024-08-30 NOTE — DISCHARGE INSTRUCTIONS
Take the following medications the morning of surgery: OMEPRAZOLE      If you are on prescription narcotic pain medication to control your pain you may also take that medication the morning of surgery.      General Instructions:     Do not eat solid food after midnight the night before surgery.  Clear liquids day of surgery are allowed but must be stopped at least two hours before your hospital arrival time.       Allowed clear liquids      Water, sodas, and tea or coffee with no cream or milk added.       12 to 20 ounces of a clear liquid that contains carbohydrates is recommended.  If non-diabetic, have Gatorade or Powerade.  If diabetic, have G2 or Powerade Zero.     Do not have liquids red in color.  Do not consume chicken, beef, pork or vegetable broth or bouillon cubes of any variety as they are not considered clear liquids and are not allowed.      Patients who avoid smoking, chewing tobacco and alcohol for 4 weeks prior to surgery have a reduced risk of post-operative complications.  Quit smoking as many days before surgery as you can.  Do not smoke, use chewing tobacco or drink alcohol the day of surgery.   Bring any papers given to you in the doctor’s office.  Wear clean comfortable clothes.  Do not wear contact lenses, false eyelashes or make-up.  Bring a case for your glasses.   Bring crutches or walker if applicable.  Remove all piercings.  Leave jewelry and any other valuables at home.  The Pre-Admission Testing nurse will instruct you to bring medications if unable to obtain an accurate list in Pre-Admission Testing.            Preventing a Surgical Site Infection:  For 2 to 3 days before surgery, avoid shaving with a razor because the razor can irritate skin and make it easier to develop an infection.    Any areas of open skin can increase the risk of a post-operative wound infection by allowing bacteria to enter and travel throughout the body.  Notify your surgeon if you have any skin wounds /  rashes even if it is not near the expected surgical site.  The area will need assessed to determine if surgery should be delayed until it is healed.  The night prior to surgery shower using a fresh bar of anti-bacterial soap (such as Dial) and clean washcloth.  Sleep in a clean bed with clean clothing.  Do not allow pets to sleep with you.  Shower on the morning of surgery using a fresh bar of anti-bacterial soap (such as Dial) and clean washcloth.  Dry with a clean towel and dress in clean clothing.  Ask your surgeon if you will be receiving antibiotics prior to surgery.  Make sure you, your family, and all healthcare providers clean their hands with soap and water or an alcohol based hand  before caring for you or your wound.    Day of surgery:  Your arrival time is approximately two hours before your scheduled surgery time.  Please note if you have an early arrival time the surgery doors do not open before 5:00 AM.  Upon arrival, a Pre-op nurse and Anesthesiologist will review your health history, obtain vital signs, and answer questions you may have.  The only belongings needed at this time will be a list of your home medications and if applicable your C-PAP/BI-PAP machine.  A Pre-op nurse will start an IV and you may receive medication in preparation for surgery, including something to help you relax.     Please be aware that surgery does come with discomfort.  We want to make every effort to control your discomfort so please discuss any uncontrolled symptoms with your nurse.   Your doctor will most likely have prescribed pain medications.      If you are going home after surgery you will receive individualized written care instructions before being discharged.  A responsible adult must drive you to and from the hospital on the day of your surgery and ideally stay with you through the night.   .  Discharge prescriptions can be filled by the hospital pharmacy during regular pharmacy hours.  If you are  having surgery late in the day/evening your prescription may be e-prescribed to your pharmacy.  Please verify your pharmacy hours or chose a 24 hour pharmacy to avoid not having access to your prescription because your pharmacy has closed for the day.    If you are staying overnight following surgery, you will be transported to your hospital room following the recovery period.  [facility] has all private rooms.    If you have any questions please call Pre-Admission Testing at [phone].  Deductibles and co-payments are collected on the day of service. Please be prepared to pay the required co-pay, deductible or deposit on the day of service as defined by your plan.    Call your surgeon immediately if you experience any of the following symptoms:  Sore Throat  Shortness of Breath or difficulty breathing  Cough  Chills  Body soreness or muscle pain  Headache  Fever  New loss of taste or smell  Do not arrive for your surgery ill.  Your procedure will need to be rescheduled to another time.  You will need to call your physician before the day of surgery to avoid any unnecessary exposure to hospital staff as well as other patients.        CHLORHEXIDINE CLOTH INSTRUCTIONS  The morning of surgery follow these instructions using the Chlorhexidine cloths you've been given.  These steps reduce bacteria on the body.  Do not use the cloths near your eyes, ears mouth, genitalia or on open wounds.  Throw the cloths away after use but do not try to flush them down a toilet.      Open and remove one cloth at a time from the package.    Leave the cloth unfolded and begin the bathing.  Massage the skin with the cloths using gentle pressure to remove bacteria.  Do not scrub harshly.   Follow the steps below with one 2% CHG cloth per area (6 total cloths).  One cloth for neck, shoulders and chest.  One cloth for both arms, hands, fingers and underarms (do underarms last).  One cloth for the abdomen followed by  groin.  One cloth for right leg and foot including between the toes.  One cloth for left leg and foot including between the toes.  The last cloth is to be used for the back of the neck, back and buttocks.    Allow the CHG to air dry 3 minutes on the skin which will give it time to work and decrease the chance of irritation.  The skin may feel sticky until it is dry.  Do not rinse with water or any other liquid or you will lose the beneficial effects of the CHG.  If mild skin irritation occurs, do rinse the skin to remove the CHG.  Report this to the nurse at time of admission.  Do not apply lotions, creams, ointments, deodorants or perfumes after using the clothes. Dress in clean clothes before coming to the hospital.

## 2024-09-06 ENCOUNTER — ANESTHESIA EVENT (OUTPATIENT)
Dept: PERIOP | Facility: HOSPITAL | Age: 64
End: 2024-09-06
Payer: MEDICARE

## 2024-09-06 ENCOUNTER — ANESTHESIA (OUTPATIENT)
Dept: PERIOP | Facility: HOSPITAL | Age: 64
End: 2024-09-06
Payer: MEDICARE

## 2024-09-06 ENCOUNTER — HOSPITAL ENCOUNTER (OUTPATIENT)
Facility: HOSPITAL | Age: 64
Setting detail: HOSPITAL OUTPATIENT SURGERY
Discharge: HOME | End: 2024-09-06
Attending: SURGERY | Admitting: SURGERY
Payer: MEDICARE

## 2024-09-06 VITALS
HEART RATE: 73 BPM | RESPIRATION RATE: 20 BRPM | OXYGEN SATURATION: 91 % | SYSTOLIC BLOOD PRESSURE: 109 MMHG | TEMPERATURE: 98 F | DIASTOLIC BLOOD PRESSURE: 77 MMHG

## 2024-09-06 DIAGNOSIS — K42.9 UMBILICAL HERNIA WITHOUT OBSTRUCTION AND WITHOUT GANGRENE: ICD-10-CM

## 2024-09-06 PROCEDURE — 25010000002 MIDAZOLAM PER 1 MG: Performed by: ANESTHESIOLOGY

## 2024-09-06 PROCEDURE — 49591 RPR AA HRN 1ST < 3 CM RDC: CPT | Performed by: SPECIALIST/TECHNOLOGIST, OTHER

## 2024-09-06 PROCEDURE — 25010000002 CEFAZOLIN PER 500 MG: Performed by: SURGERY

## 2024-09-06 PROCEDURE — 25010000002 ONDANSETRON PER 1 MG: Performed by: NURSE ANESTHETIST, CERTIFIED REGISTERED

## 2024-09-06 PROCEDURE — 25010000002 PROPOFOL 10 MG/ML EMULSION: Performed by: NURSE ANESTHETIST, CERTIFIED REGISTERED

## 2024-09-06 PROCEDURE — 25010000002 GLYCOPYRROLATE 0.2 MG/ML SOLUTION: Performed by: NURSE ANESTHETIST, CERTIFIED REGISTERED

## 2024-09-06 PROCEDURE — S0260 H&P FOR SURGERY: HCPCS | Performed by: SURGERY

## 2024-09-06 PROCEDURE — 94799 UNLISTED PULMONARY SVC/PX: CPT

## 2024-09-06 PROCEDURE — 94640 AIRWAY INHALATION TREATMENT: CPT

## 2024-09-06 PROCEDURE — 25010000002 SUGAMMADEX 200 MG/2ML SOLUTION: Performed by: NURSE ANESTHETIST, CERTIFIED REGISTERED

## 2024-09-06 PROCEDURE — 25010000002 DEXAMETHASONE PER 1 MG: Performed by: NURSE ANESTHETIST, CERTIFIED REGISTERED

## 2024-09-06 PROCEDURE — 25810000003 LACTATED RINGERS PER 1000 ML: Performed by: NURSE ANESTHETIST, CERTIFIED REGISTERED

## 2024-09-06 PROCEDURE — 49591 RPR AA HRN 1ST < 3 CM RDC: CPT | Performed by: SURGERY

## 2024-09-06 PROCEDURE — C1781 MESH (IMPLANTABLE): HCPCS | Performed by: SURGERY

## 2024-09-06 PROCEDURE — 25010000002 FENTANYL CITRATE (PF) 50 MCG/ML SOLUTION: Performed by: NURSE ANESTHETIST, CERTIFIED REGISTERED

## 2024-09-06 PROCEDURE — 25810000003 LACTATED RINGERS PER 1000 ML: Performed by: ANESTHESIOLOGY

## 2024-09-06 DEVICE — VENTRALEX ST HERNIA PATCH
Type: IMPLANTABLE DEVICE | Site: ABDOMEN | Status: FUNCTIONAL
Brand: VENTRALEX ST HERNIA PATCH

## 2024-09-06 RX ORDER — HYDRALAZINE HYDROCHLORIDE 20 MG/ML
5 INJECTION INTRAMUSCULAR; INTRAVENOUS
Status: DISCONTINUED | OUTPATIENT
Start: 2024-09-06 | End: 2024-09-06 | Stop reason: HOSPADM

## 2024-09-06 RX ORDER — GLYCOPYRROLATE 0.2 MG/ML
INJECTION INTRAMUSCULAR; INTRAVENOUS AS NEEDED
Status: DISCONTINUED | OUTPATIENT
Start: 2024-09-06 | End: 2024-09-06 | Stop reason: SURG

## 2024-09-06 RX ORDER — HYDROCODONE BITARTRATE AND ACETAMINOPHEN 5; 325 MG/1; MG/1
1 TABLET ORAL ONCE AS NEEDED
Status: DISCONTINUED | OUTPATIENT
Start: 2024-09-06 | End: 2024-09-06 | Stop reason: HOSPADM

## 2024-09-06 RX ORDER — MIDAZOLAM HYDROCHLORIDE 1 MG/ML
1 INJECTION INTRAMUSCULAR; INTRAVENOUS
Status: COMPLETED | OUTPATIENT
Start: 2024-09-06 | End: 2024-09-06

## 2024-09-06 RX ORDER — FLUMAZENIL 0.1 MG/ML
0.2 INJECTION INTRAVENOUS AS NEEDED
Status: DISCONTINUED | OUTPATIENT
Start: 2024-09-06 | End: 2024-09-06 | Stop reason: HOSPADM

## 2024-09-06 RX ORDER — SODIUM CHLORIDE, SODIUM LACTATE, POTASSIUM CHLORIDE, CALCIUM CHLORIDE 600; 310; 30; 20 MG/100ML; MG/100ML; MG/100ML; MG/100ML
INJECTION, SOLUTION INTRAVENOUS CONTINUOUS PRN
Status: DISCONTINUED | OUTPATIENT
Start: 2024-09-06 | End: 2024-09-06 | Stop reason: SURG

## 2024-09-06 RX ORDER — BUPIVACAINE HYDROCHLORIDE AND EPINEPHRINE 2.5; 5 MG/ML; UG/ML
INJECTION, SOLUTION EPIDURAL; INFILTRATION; INTRACAUDAL; PERINEURAL AS NEEDED
Status: DISCONTINUED | OUTPATIENT
Start: 2024-09-06 | End: 2024-09-06 | Stop reason: HOSPADM

## 2024-09-06 RX ORDER — DROPERIDOL 2.5 MG/ML
0.62 INJECTION, SOLUTION INTRAMUSCULAR; INTRAVENOUS
Status: DISCONTINUED | OUTPATIENT
Start: 2024-09-06 | End: 2024-09-06 | Stop reason: HOSPADM

## 2024-09-06 RX ORDER — FENTANYL CITRATE 50 UG/ML
INJECTION, SOLUTION INTRAMUSCULAR; INTRAVENOUS AS NEEDED
Status: DISCONTINUED | OUTPATIENT
Start: 2024-09-06 | End: 2024-09-06 | Stop reason: SURG

## 2024-09-06 RX ORDER — PROMETHAZINE HYDROCHLORIDE 25 MG/1
25 TABLET ORAL ONCE AS NEEDED
Status: DISCONTINUED | OUTPATIENT
Start: 2024-09-06 | End: 2024-09-06 | Stop reason: HOSPADM

## 2024-09-06 RX ORDER — DIPHENHYDRAMINE HYDROCHLORIDE 50 MG/ML
12.5 INJECTION INTRAMUSCULAR; INTRAVENOUS
Status: DISCONTINUED | OUTPATIENT
Start: 2024-09-06 | End: 2024-09-06 | Stop reason: HOSPADM

## 2024-09-06 RX ORDER — FENTANYL CITRATE 50 UG/ML
50 INJECTION, SOLUTION INTRAMUSCULAR; INTRAVENOUS ONCE AS NEEDED
Status: DISCONTINUED | OUTPATIENT
Start: 2024-09-06 | End: 2024-09-06 | Stop reason: HOSPADM

## 2024-09-06 RX ORDER — ROCURONIUM BROMIDE 10 MG/ML
INJECTION, SOLUTION INTRAVENOUS AS NEEDED
Status: DISCONTINUED | OUTPATIENT
Start: 2024-09-06 | End: 2024-09-06 | Stop reason: SURG

## 2024-09-06 RX ORDER — PROMETHAZINE HYDROCHLORIDE 25 MG/1
25 SUPPOSITORY RECTAL ONCE AS NEEDED
Status: DISCONTINUED | OUTPATIENT
Start: 2024-09-06 | End: 2024-09-06 | Stop reason: HOSPADM

## 2024-09-06 RX ORDER — LIDOCAINE HYDROCHLORIDE 20 MG/ML
INJECTION, SOLUTION INFILTRATION; PERINEURAL AS NEEDED
Status: DISCONTINUED | OUTPATIENT
Start: 2024-09-06 | End: 2024-09-06 | Stop reason: SURG

## 2024-09-06 RX ORDER — PROPOFOL 10 MG/ML
VIAL (ML) INTRAVENOUS AS NEEDED
Status: DISCONTINUED | OUTPATIENT
Start: 2024-09-06 | End: 2024-09-06 | Stop reason: SURG

## 2024-09-06 RX ORDER — PHENYLEPHRINE HCL IN 0.9% NACL 1 MG/10 ML
SYRINGE (ML) INTRAVENOUS AS NEEDED
Status: DISCONTINUED | OUTPATIENT
Start: 2024-09-06 | End: 2024-09-06 | Stop reason: SURG

## 2024-09-06 RX ORDER — MIDAZOLAM HYDROCHLORIDE 1 MG/ML
0.5 INJECTION INTRAMUSCULAR; INTRAVENOUS
Status: DISCONTINUED | OUTPATIENT
Start: 2024-09-06 | End: 2024-09-06 | Stop reason: HOSPADM

## 2024-09-06 RX ORDER — HYDROCODONE BITARTRATE AND ACETAMINOPHEN 5; 325 MG/1; MG/1
1 TABLET ORAL EVERY 6 HOURS PRN
Qty: 15 TABLET | Refills: 0 | Status: SHIPPED | OUTPATIENT
Start: 2024-09-06

## 2024-09-06 RX ORDER — DEXAMETHASONE SODIUM PHOSPHATE 4 MG/ML
INJECTION, SOLUTION INTRA-ARTICULAR; INTRALESIONAL; INTRAMUSCULAR; INTRAVENOUS; SOFT TISSUE AS NEEDED
Status: DISCONTINUED | OUTPATIENT
Start: 2024-09-06 | End: 2024-09-06 | Stop reason: SURG

## 2024-09-06 RX ORDER — EPHEDRINE SULFATE 50 MG/ML
5 INJECTION, SOLUTION INTRAVENOUS ONCE AS NEEDED
Status: DISCONTINUED | OUTPATIENT
Start: 2024-09-06 | End: 2024-09-06 | Stop reason: HOSPADM

## 2024-09-06 RX ORDER — HYDROCODONE BITARTRATE AND ACETAMINOPHEN 7.5; 325 MG/1; MG/1
1 TABLET ORAL EVERY 4 HOURS PRN
Status: DISCONTINUED | OUTPATIENT
Start: 2024-09-06 | End: 2024-09-06 | Stop reason: HOSPADM

## 2024-09-06 RX ORDER — SODIUM CHLORIDE 0.9 % (FLUSH) 0.9 %
3 SYRINGE (ML) INJECTION EVERY 12 HOURS SCHEDULED
Status: DISCONTINUED | OUTPATIENT
Start: 2024-09-06 | End: 2024-09-06 | Stop reason: HOSPADM

## 2024-09-06 RX ORDER — IPRATROPIUM BROMIDE AND ALBUTEROL SULFATE 2.5; .5 MG/3ML; MG/3ML
3 SOLUTION RESPIRATORY (INHALATION) ONCE AS NEEDED
Status: COMPLETED | OUTPATIENT
Start: 2024-09-06 | End: 2024-09-06

## 2024-09-06 RX ORDER — ONDANSETRON 2 MG/ML
4 INJECTION INTRAMUSCULAR; INTRAVENOUS ONCE AS NEEDED
Status: DISCONTINUED | OUTPATIENT
Start: 2024-09-06 | End: 2024-09-06 | Stop reason: HOSPADM

## 2024-09-06 RX ORDER — HYDROMORPHONE HYDROCHLORIDE 1 MG/ML
0.25 INJECTION, SOLUTION INTRAMUSCULAR; INTRAVENOUS; SUBCUTANEOUS
Status: DISCONTINUED | OUTPATIENT
Start: 2024-09-06 | End: 2024-09-06 | Stop reason: HOSPADM

## 2024-09-06 RX ORDER — NALOXONE HCL 0.4 MG/ML
0.2 VIAL (ML) INJECTION AS NEEDED
Status: DISCONTINUED | OUTPATIENT
Start: 2024-09-06 | End: 2024-09-06 | Stop reason: HOSPADM

## 2024-09-06 RX ORDER — SODIUM CHLORIDE 0.9 % (FLUSH) 0.9 %
3-10 SYRINGE (ML) INJECTION AS NEEDED
Status: DISCONTINUED | OUTPATIENT
Start: 2024-09-06 | End: 2024-09-06 | Stop reason: HOSPADM

## 2024-09-06 RX ORDER — LIDOCAINE HYDROCHLORIDE 10 MG/ML
0.5 INJECTION, SOLUTION INFILTRATION; PERINEURAL ONCE AS NEEDED
Status: DISCONTINUED | OUTPATIENT
Start: 2024-09-06 | End: 2024-09-06 | Stop reason: HOSPADM

## 2024-09-06 RX ORDER — FAMOTIDINE 10 MG/ML
20 INJECTION, SOLUTION INTRAVENOUS ONCE
Status: COMPLETED | OUTPATIENT
Start: 2024-09-06 | End: 2024-09-06

## 2024-09-06 RX ORDER — LABETALOL HYDROCHLORIDE 5 MG/ML
5 INJECTION, SOLUTION INTRAVENOUS
Status: DISCONTINUED | OUTPATIENT
Start: 2024-09-06 | End: 2024-09-06 | Stop reason: HOSPADM

## 2024-09-06 RX ORDER — SODIUM CHLORIDE 9 MG/ML
40 INJECTION, SOLUTION INTRAVENOUS AS NEEDED
Status: DISCONTINUED | OUTPATIENT
Start: 2024-09-06 | End: 2024-09-06 | Stop reason: HOSPADM

## 2024-09-06 RX ORDER — SODIUM CHLORIDE 0.9 % (FLUSH) 0.9 %
10 SYRINGE (ML) INJECTION AS NEEDED
Status: DISCONTINUED | OUTPATIENT
Start: 2024-09-06 | End: 2024-09-06 | Stop reason: HOSPADM

## 2024-09-06 RX ORDER — FENTANYL CITRATE 50 UG/ML
25 INJECTION, SOLUTION INTRAMUSCULAR; INTRAVENOUS
Status: DISCONTINUED | OUTPATIENT
Start: 2024-09-06 | End: 2024-09-06 | Stop reason: HOSPADM

## 2024-09-06 RX ORDER — MAGNESIUM HYDROXIDE 1200 MG/15ML
LIQUID ORAL AS NEEDED
Status: DISCONTINUED | OUTPATIENT
Start: 2024-09-06 | End: 2024-09-06 | Stop reason: HOSPADM

## 2024-09-06 RX ORDER — ONDANSETRON 2 MG/ML
INJECTION INTRAMUSCULAR; INTRAVENOUS AS NEEDED
Status: DISCONTINUED | OUTPATIENT
Start: 2024-09-06 | End: 2024-09-06 | Stop reason: SURG

## 2024-09-06 RX ORDER — SODIUM CHLORIDE 0.9 % (FLUSH) 0.9 %
10 SYRINGE (ML) INJECTION EVERY 12 HOURS SCHEDULED
Status: DISCONTINUED | OUTPATIENT
Start: 2024-09-06 | End: 2024-09-06 | Stop reason: HOSPADM

## 2024-09-06 RX ORDER — SODIUM CHLORIDE, SODIUM LACTATE, POTASSIUM CHLORIDE, CALCIUM CHLORIDE 600; 310; 30; 20 MG/100ML; MG/100ML; MG/100ML; MG/100ML
9 INJECTION, SOLUTION INTRAVENOUS CONTINUOUS
Status: DISCONTINUED | OUTPATIENT
Start: 2024-09-06 | End: 2024-09-06 | Stop reason: HOSPADM

## 2024-09-06 RX ADMIN — FAMOTIDINE 20 MG: 10 INJECTION INTRAVENOUS at 08:58

## 2024-09-06 RX ADMIN — MIDAZOLAM 1 MG: 1 INJECTION INTRAMUSCULAR; INTRAVENOUS at 08:58

## 2024-09-06 RX ADMIN — SODIUM CHLORIDE 2000 MG: 900 INJECTION INTRAVENOUS at 10:28

## 2024-09-06 RX ADMIN — FENTANYL CITRATE 25 MCG: 50 INJECTION, SOLUTION INTRAMUSCULAR; INTRAVENOUS at 11:23

## 2024-09-06 RX ADMIN — MIDAZOLAM 1 MG: 1 INJECTION INTRAMUSCULAR; INTRAVENOUS at 10:28

## 2024-09-06 RX ADMIN — LIDOCAINE HYDROCHLORIDE 100 MG: 20 INJECTION, SOLUTION INFILTRATION; PERINEURAL at 10:45

## 2024-09-06 RX ADMIN — SODIUM CHLORIDE, POTASSIUM CHLORIDE, SODIUM LACTATE AND CALCIUM CHLORIDE: 600; 310; 30; 20 INJECTION, SOLUTION INTRAVENOUS at 10:36

## 2024-09-06 RX ADMIN — SUGAMMADEX 200 MG: 100 INJECTION, SOLUTION INTRAVENOUS at 11:23

## 2024-09-06 RX ADMIN — SODIUM CHLORIDE, POTASSIUM CHLORIDE, SODIUM LACTATE AND CALCIUM CHLORIDE 9 ML/HR: 600; 310; 30; 20 INJECTION, SOLUTION INTRAVENOUS at 08:58

## 2024-09-06 RX ADMIN — DEXAMETHASONE SODIUM PHOSPHATE 8 MG: 4 INJECTION, SOLUTION INTRA-ARTICULAR; INTRALESIONAL; INTRAMUSCULAR; INTRAVENOUS; SOFT TISSUE at 10:53

## 2024-09-06 RX ADMIN — Medication 100 MCG: at 10:58

## 2024-09-06 RX ADMIN — GLYCOPYRROLATE 0.1 MG: 0.2 INJECTION INTRAMUSCULAR; INTRAVENOUS at 10:53

## 2024-09-06 RX ADMIN — IPRATROPIUM BROMIDE AND ALBUTEROL SULFATE 3 ML: .5; 3 SOLUTION RESPIRATORY (INHALATION) at 11:48

## 2024-09-06 RX ADMIN — ONDANSETRON 4 MG: 2 INJECTION INTRAMUSCULAR; INTRAVENOUS at 11:23

## 2024-09-06 RX ADMIN — PROPOFOL 200 MG: 10 INJECTION, EMULSION INTRAVENOUS at 10:45

## 2024-09-06 RX ADMIN — HYDROCODONE BITARTRATE AND ACETAMINOPHEN 1 TABLET: 7.5; 325 TABLET ORAL at 12:05

## 2024-09-06 RX ADMIN — ROCURONIUM BROMIDE 50 MG: 10 INJECTION, SOLUTION INTRAVENOUS at 10:45

## 2024-09-06 RX ADMIN — FENTANYL CITRATE 25 MCG: 50 INJECTION, SOLUTION INTRAMUSCULAR; INTRAVENOUS at 10:45

## 2024-09-06 NOTE — H&P
Cc: Umbilical hernia     History of presenting illness:   This is a quite nice, reasonably healthy 64-year-old obese female states that she has noticed an umbilical hernia for the last couple of years.  She lost about 50 pounds earlier this year and since then the hernias been more prominent.  There is mild pain, particularly with direct pressure.  No obstructive symptoms.     Past Medical History: Obesity, back pain, tobacco abuse, COPD, cervical cancer     Past Surgical History: Colonoscopy and EGD most recently May 2024, remote appendectomy in the 1960s, laparoscopic hysterectomy around 2003, patient states no radiation at that time.  Spinal cord stimulator placement.     Medications: Albuterol, omeprazole, Crestor     Allergies: Prozac     Social History: She is an ongoing cigarette smoker about half pack per day and has been trying to cut down     Family History: Negative for colorectal cancer     Review of Systems:  Constitutional: Denies fever, chills, positive for weight loss  Neck: no swollen glands or dysphagia or odynophagia  Respiratory: negative for SOB, cough, hemoptysis or wheezing  Cardiovascular: negative for chest pain, palpitations or peripheral edema  Gastrointestinal: Positive for mild periumbilical pain, denies change in bowel habits, nausea, vomiting        Physical Exam:  BMI: 32.6, weight 167 pounds today  General: alert and oriented, appropriate, no acute distress  Eyes: No scleral icterus, extraocular movements are intact  Neck: Supple without lymphadenopathy or thyromegaly, trachea is in the midline  Respiratory: There is good bilateral chest expansion, no use of accessory muscles is noted  Cardiovascular: No jugular venous distention or peripheral edema is seen  Gastrointestinal: Obese but soft.  There is a reducible umbilical hernia with a fascial defect of about 1 to 1.5 cm.  Mild tenderness with reduction of the hernia.     Laboratory data: Unremarkable     Imaging data: No recent  relevant data, but CT read from [facility] in 2021 did mention a fat-containing umbilical hernia        Assessment and plan:   -Initial symptomatic and reducible umbilical hernia  -Options discussed with patient.  I recommended proceeding with open umbilical hernia repair, possible mesh placement depending on findings.  It may be that the fascial opening is small enough that it is not indicated.  We discussed the risk including bleeding, infection, recurrence, pain and injury to surrounding structures.  Patient is agreeable to proceed as outlined.        Bradley Purcell MD, FACS  General, Minimally Invasive and Endoscopic Surgery  [facility]     [address], [address]  [place], [place], [address]  P: [phone]  F: [phone]     History and physical is copied, brought forward, pasted, edited and updated as appropriate from prior office visit.

## 2024-09-06 NOTE — ANESTHESIA PROCEDURE NOTES
Airway  Urgency: elective    Date/Time: 9/6/2024 10:49 AM  Airway not difficult    General Information and Staff    Patient location during procedure: OR    Indications and Patient Condition  Indications for airway management: airway protection    Preoxygenated: yes  Mask difficulty assessment: 2 - vent by mask + OA or adjuvant +/- NMBA    Final Airway Details  Final airway type: endotracheal airway      Successful airway: ETT  Cuffed: yes   Successful intubation technique: direct laryngoscopy  Facilitating devices/methods: intubating stylet  Endotracheal tube insertion site: oral  Blade: Karla  Blade size: 3  ETT size (mm): 7.0  Cormack-Lehane Classification: grade I - full view of glottis  Placement verified by: chest auscultation and capnometry   Measured from: lips  ETT/EBT  to lips (cm): 22  Number of attempts at approach: 1  Assessment: lips, teeth, and gum same as pre-op and atraumatic intubation

## 2024-09-06 NOTE — OP NOTE
Operative Note :   MD Lisa Yenavin ADLER Avtar  1960    Procedure Date: 09/06/24    Pre-op Diagnosis:  Umbilical hernia without obstruction and without gangrene [K42.9]    Post-Op Diagnosis:  Same    Procedure:   Open umbilical hernia repair with mesh placement, size of fascial defect less than 2 cm    Surgeon: Bradley Purcell MD    Assistant: Naina Bello CSA was responsible for performing the following activities: Retraction, Suction, Irrigation, Suturing, Closing, and Placing Dressing and their skilled assistance was necessary for the success of this case.      Anesthesia:  General (general endotracheal tube)    EBL:   minimal    Specimens:   None    Indications:  64-year-old female with an initial symptomatic and reducible umbilical hernia    Findings:   Umbilical hernia with fascial defect of approximately 1.5 cm    Recommendations:   Routine post hernia care    Description of procedure:    After obtaining informed consent, the patient was brought to the operating room placed under a general anesthetic.  Her abdomen was sterilely prepped and draped.  Infraumbilical curvilinear incision made dissected through the skin and subcutaneous tissues.  Umbilical skin was  away from the underlying hernia sac.  Hernia sac was dissected circumferentially to the level of the fascia.  Preperitoneal space was then dissected circumferentially for about 2 to 3 cm.  6.4 cm Bard Ventralex patch was introduced into the preperitoneal space and it seemed to lay flat.  The straps were cut away.  The mesh was secured to the fascia with 0 Ethibond sutures.  The fascia was then closed over the mesh with interrupted 0 Ethibond sutures, incorporating bites of the straps of the mesh into the closure.  The umbilical skin was tacked to the fascia with 3-0 Vicryl.  Subcutaneous tissues were reapproximated with 3-0 Vicryl.  Skin was closed with 4-0 Monocryl subcuticular.  Sterile dressings were applied.    Bradley  MD Binh  General and Endoscopic Surgery  [facility]    [address], [address]  [place], [place], [address]  P: [phone]  F: [phone]

## 2024-09-06 NOTE — ANESTHESIA PREPROCEDURE EVALUATION
Anesthesia Evaluation     Patient summary reviewed and Nursing notes reviewed   no history of anesthetic complications:   NPO Solid Status: > 8 hours  NPO Liquid Status: > 2 hours           Airway   Dental      Pulmonary    (+) COPD,  Cardiovascular     (+) hyperlipidemia      Neuro/Psych  (+) psychiatric history Depression  GI/Hepatic/Renal/Endo    (+) GERD    Musculoskeletal     Abdominal   (+) obese   Substance History      OB/GYN          Other                          Anesthesia Plan    ASA 3     general     intravenous induction     Anesthetic plan, risks, benefits, and alternatives have been provided, discussed and informed consent has been obtained with: patient.        CODE STATUS:

## 2024-09-06 NOTE — ANESTHESIA POSTPROCEDURE EVALUATION
Patient: Leah Nova    Procedure Summary       Date: 09/06/24 Room / Location: [facility] OR [address] [place] [address] MAIN OR    Anesthesia Start: 1036 Anesthesia Stop: 1140    Procedure: UMBILICAL HERNIA REPAIR, with mesh placement (Abdomen) Diagnosis:       Umbilical hernia without obstruction and without gangrene      (Umbilical hernia without obstruction and without gangrene [K42.9])    Surgeons: Bradley Purcell MD Provider: Grey Leung MD    Anesthesia Type: general ASA Status: 3            Anesthesia Type: general    Vitals  Vitals Value Taken Time   /67 09/06/24 1230   Temp 36.7 °C (98 °F) 09/06/24 1136   Pulse 70 09/06/24 1230   Resp 21 09/06/24 1215   SpO2 86 % 09/06/24 1230   Vitals shown include unfiled device data.        Post Anesthesia Care and Evaluation    Patient location during evaluation: bedside  Patient participation: complete - patient participated  Level of consciousness: awake and alert  Pain management: adequate    Airway patency: patent  Anesthetic complications: No anesthetic complications    Cardiovascular status: acceptable  Respiratory status: acceptable  Hydration status: acceptable    Comments: /67   Pulse 73   Temp 36.7 °C (98 °F) (Oral)   Resp 21   SpO2 91%

## 2024-09-19 ENCOUNTER — OFFICE VISIT (OUTPATIENT)
Dept: SURGERY | Facility: CLINIC | Age: 64
End: 2024-09-19
Payer: MEDICARE

## 2024-09-19 VITALS
WEIGHT: 165 LBS | SYSTOLIC BLOOD PRESSURE: 118 MMHG | BODY MASS INDEX: 32.39 KG/M2 | DIASTOLIC BLOOD PRESSURE: 74 MMHG | HEIGHT: 60 IN

## 2024-09-19 DIAGNOSIS — K42.9 UMBILICAL HERNIA WITHOUT OBSTRUCTION AND WITHOUT GANGRENE: Primary | ICD-10-CM

## 2024-09-19 PROCEDURE — 1160F RVW MEDS BY RX/DR IN RCRD: CPT | Performed by: SURGERY

## 2024-09-19 PROCEDURE — 99212 OFFICE O/P EST SF 10 MIN: CPT | Performed by: SURGERY

## 2024-09-19 PROCEDURE — 1159F MED LIST DOCD IN RCRD: CPT | Performed by: SURGERY

## 2024-09-19 RX ORDER — TENAPANOR HYDROCHLORIDE 53.2 MG/1
TABLET ORAL
COMMUNITY
Start: 2024-08-16

## 2024-11-05 ENCOUNTER — TELEPHONE (OUTPATIENT)
Dept: GASTROENTEROLOGY | Facility: CLINIC | Age: 64
End: 2024-11-05
Payer: MEDICARE

## 2025-06-24 ENCOUNTER — APPOINTMENT (OUTPATIENT)
Dept: MRI IMAGING | Facility: HOSPITAL | Age: 65
End: 2025-06-24
Payer: MEDICARE

## 2025-06-24 ENCOUNTER — HOSPITAL ENCOUNTER (OUTPATIENT)
Facility: HOSPITAL | Age: 65
Setting detail: OBSERVATION
Discharge: HOME | End: 2025-06-25
Attending: EMERGENCY MEDICINE | Admitting: EMERGENCY MEDICINE
Payer: MEDICARE

## 2025-06-24 ENCOUNTER — APPOINTMENT (OUTPATIENT)
Dept: CT IMAGING | Facility: HOSPITAL | Age: 65
End: 2025-06-24
Payer: MEDICARE

## 2025-06-24 DIAGNOSIS — R51.9 ACUTE INTRACTABLE HEADACHE, UNSPECIFIED HEADACHE TYPE: Primary | ICD-10-CM

## 2025-06-24 DIAGNOSIS — H53.2 DOUBLE VISION: ICD-10-CM

## 2025-06-24 DIAGNOSIS — Z91.89 AT RISK FOR OBSTRUCTIVE SLEEP APNEA: ICD-10-CM

## 2025-06-24 LAB
ALBUMIN SERPL-MCNC: 4.5 G/DL (ref 3.5–5.2)
ALBUMIN/GLOB SERPL: 1.4 G/DL
ALP SERPL-CCNC: 104 U/L (ref 39–117)
ALT SERPL W P-5'-P-CCNC: 11 U/L (ref 1–33)
ANION GAP SERPL CALCULATED.3IONS-SCNC: 10.6 MMOL/L (ref 5–15)
AST SERPL-CCNC: 16 U/L (ref 1–32)
BACTERIA UR QL AUTO: ABNORMAL /HPF
BASOPHILS # BLD AUTO: 0.09 10*3/MM3 (ref 0–0.2)
BASOPHILS NFR BLD AUTO: 1.1 % (ref 0–1.5)
BILIRUB SERPL-MCNC: 0.3 MG/DL (ref 0–1.2)
BILIRUB UR QL STRIP: NEGATIVE
BUN SERPL-MCNC: 4 MG/DL (ref 8–23)
BUN/CREAT SERPL: 5.1 (ref 7–25)
CALCIUM SPEC-SCNC: 9.4 MG/DL (ref 8.6–10.5)
CHLORIDE SERPL-SCNC: 105 MMOL/L (ref 98–107)
CLARITY UR: CLEAR
CO2 SERPL-SCNC: 27.4 MMOL/L (ref 22–29)
COLOR UR: YELLOW
CREAT SERPL-MCNC: 0.79 MG/DL (ref 0.57–1)
CRP SERPL-MCNC: <0.3 MG/DL (ref 0–0.5)
DEPRECATED RDW RBC AUTO: 46.8 FL (ref 37–54)
EGFRCR SERPLBLD CKD-EPI 2021: 82.6 ML/MIN/1.73
EOSINOPHIL # BLD AUTO: 0.34 10*3/MM3 (ref 0–0.4)
EOSINOPHIL NFR BLD AUTO: 4.1 % (ref 0.3–6.2)
ERYTHROCYTE [DISTWIDTH] IN BLOOD BY AUTOMATED COUNT: 13.5 % (ref 12.3–15.4)
ERYTHROCYTE [SEDIMENTATION RATE] IN BLOOD: 9 MM/HR (ref 0–30)
GEN 5 1HR TROPONIN T REFLEX: 7 NG/L
GLOBULIN UR ELPH-MCNC: 3.2 GM/DL
GLUCOSE SERPL-MCNC: 94 MG/DL (ref 65–99)
GLUCOSE UR STRIP-MCNC: NEGATIVE MG/DL
HCT VFR BLD AUTO: 43.2 % (ref 34–46.6)
HGB BLD-MCNC: 14 G/DL (ref 12–15.9)
HGB UR QL STRIP.AUTO: ABNORMAL
HYALINE CASTS UR QL AUTO: ABNORMAL /LPF
IMM GRANULOCYTES # BLD AUTO: 0.03 10*3/MM3 (ref 0–0.05)
IMM GRANULOCYTES NFR BLD AUTO: 0.4 % (ref 0–0.5)
KETONES UR QL STRIP: NEGATIVE
LEUKOCYTE ESTERASE UR QL STRIP.AUTO: NEGATIVE
LYMPHOCYTES # BLD AUTO: 3.56 10*3/MM3 (ref 0.7–3.1)
LYMPHOCYTES NFR BLD AUTO: 43 % (ref 19.6–45.3)
MAGNESIUM SERPL-MCNC: 2.3 MG/DL (ref 1.6–2.4)
MCH RBC QN AUTO: 30.6 PG (ref 26.6–33)
MCHC RBC AUTO-ENTMCNC: 32.4 G/DL (ref 31.5–35.7)
MCV RBC AUTO: 94.3 FL (ref 79–97)
MONOCYTES # BLD AUTO: 0.63 10*3/MM3 (ref 0.1–0.9)
MONOCYTES NFR BLD AUTO: 7.6 % (ref 5–12)
NEUTROPHILS NFR BLD AUTO: 3.62 10*3/MM3 (ref 1.7–7)
NEUTROPHILS NFR BLD AUTO: 43.8 % (ref 42.7–76)
NITRITE UR QL STRIP: NEGATIVE
NRBC BLD AUTO-RTO: 0 /100 WBC (ref 0–0.2)
PH UR STRIP.AUTO: 7 [PH] (ref 5–8)
PLATELET # BLD AUTO: 257 10*3/MM3 (ref 140–450)
PMV BLD AUTO: 9 FL (ref 6–12)
POTASSIUM SERPL-SCNC: 4.4 MMOL/L (ref 3.5–5.2)
PROT SERPL-MCNC: 7.7 G/DL (ref 6–8.5)
PROT UR QL STRIP: NEGATIVE
RBC # BLD AUTO: 4.58 10*6/MM3 (ref 3.77–5.28)
RBC # UR STRIP: ABNORMAL /HPF
REF LAB TEST METHOD: ABNORMAL
SODIUM SERPL-SCNC: 143 MMOL/L (ref 136–145)
SP GR UR STRIP: >1.03 (ref 1–1.03)
SQUAMOUS #/AREA URNS HPF: ABNORMAL /HPF
TROPONIN T NUMERIC DELTA: -2 NG/L
TROPONIN T SERPL HS-MCNC: 9 NG/L
UROBILINOGEN UR QL STRIP: ABNORMAL
WBC # UR STRIP: ABNORMAL /HPF
WBC NRBC COR # BLD AUTO: 8.27 10*3/MM3 (ref 3.4–10.8)

## 2025-06-24 PROCEDURE — 85025 COMPLETE CBC W/AUTO DIFF WBC: CPT | Performed by: EMERGENCY MEDICINE

## 2025-06-24 PROCEDURE — 25010000002 PROCHLORPERAZINE 10 MG/2ML SOLUTION: Performed by: EMERGENCY MEDICINE

## 2025-06-24 PROCEDURE — 85652 RBC SED RATE AUTOMATED: CPT | Performed by: EMERGENCY MEDICINE

## 2025-06-24 PROCEDURE — G0378 HOSPITAL OBSERVATION PER HR: HCPCS

## 2025-06-24 PROCEDURE — 83735 ASSAY OF MAGNESIUM: CPT | Performed by: EMERGENCY MEDICINE

## 2025-06-24 PROCEDURE — 25010000002 PROCHLORPERAZINE 10 MG/2ML SOLUTION: Performed by: NURSE PRACTITIONER

## 2025-06-24 PROCEDURE — 70498 CT ANGIOGRAPHY NECK: CPT

## 2025-06-24 PROCEDURE — 25510000001 IOPAMIDOL PER 1 ML: Performed by: EMERGENCY MEDICINE

## 2025-06-24 PROCEDURE — 25010000002 KETOROLAC TROMETHAMINE PER 15 MG: Performed by: NURSE PRACTITIONER

## 2025-06-24 PROCEDURE — 96375 TX/PRO/DX INJ NEW DRUG ADDON: CPT

## 2025-06-24 PROCEDURE — 86140 C-REACTIVE PROTEIN: CPT | Performed by: EMERGENCY MEDICINE

## 2025-06-24 PROCEDURE — 80053 COMPREHEN METABOLIC PANEL: CPT | Performed by: EMERGENCY MEDICINE

## 2025-06-24 PROCEDURE — 25010000002 MAGNESIUM SULFATE 2 GM/50ML SOLUTION: Performed by: NURSE PRACTITIONER

## 2025-06-24 PROCEDURE — 70496 CT ANGIOGRAPHY HEAD: CPT

## 2025-06-24 PROCEDURE — 36415 COLL VENOUS BLD VENIPUNCTURE: CPT

## 2025-06-24 PROCEDURE — 25010000002 MAGNESIUM SULFATE IN D5W 1G/100ML (PREMIX) 1-5 GM/100ML-% SOLUTION: Performed by: EMERGENCY MEDICINE

## 2025-06-24 PROCEDURE — 25010000002 DIPHENHYDRAMINE PER 50 MG: Performed by: EMERGENCY MEDICINE

## 2025-06-24 PROCEDURE — 96361 HYDRATE IV INFUSION ADD-ON: CPT

## 2025-06-24 PROCEDURE — 81001 URINALYSIS AUTO W/SCOPE: CPT | Performed by: EMERGENCY MEDICINE

## 2025-06-24 PROCEDURE — 84484 ASSAY OF TROPONIN QUANT: CPT | Performed by: EMERGENCY MEDICINE

## 2025-06-24 PROCEDURE — 96365 THER/PROPH/DIAG IV INF INIT: CPT

## 2025-06-24 PROCEDURE — 99285 EMERGENCY DEPT VISIT HI MDM: CPT

## 2025-06-24 PROCEDURE — 25010000002 MORPHINE PER 10 MG: Performed by: EMERGENCY MEDICINE

## 2025-06-24 PROCEDURE — 25010000002 DIPHENHYDRAMINE PER 50 MG: Performed by: NURSE PRACTITIONER

## 2025-06-24 PROCEDURE — 96376 TX/PRO/DX INJ SAME DRUG ADON: CPT

## 2025-06-24 PROCEDURE — 25010000002 DROPERIDOL PER 5 MG: Performed by: EMERGENCY MEDICINE

## 2025-06-24 PROCEDURE — 25810000003 SODIUM CHLORIDE 0.9 % SOLUTION: Performed by: NURSE PRACTITIONER

## 2025-06-24 RX ORDER — DROPERIDOL 2.5 MG/ML
2.5 INJECTION, SOLUTION INTRAMUSCULAR; INTRAVENOUS ONCE
Status: COMPLETED | OUTPATIENT
Start: 2025-06-24 | End: 2025-06-24

## 2025-06-24 RX ORDER — DIPHENHYDRAMINE HYDROCHLORIDE 50 MG/ML
25 INJECTION, SOLUTION INTRAMUSCULAR; INTRAVENOUS ONCE
Status: COMPLETED | OUTPATIENT
Start: 2025-06-24 | End: 2025-06-24

## 2025-06-24 RX ORDER — DIPHENHYDRAMINE HYDROCHLORIDE 50 MG/ML
25 INJECTION, SOLUTION INTRAMUSCULAR; INTRAVENOUS EVERY 6 HOURS PRN
Status: DISCONTINUED | OUTPATIENT
Start: 2025-06-24 | End: 2025-06-25 | Stop reason: HOSPADM

## 2025-06-24 RX ORDER — PROCHLORPERAZINE EDISYLATE 5 MG/ML
10 INJECTION INTRAMUSCULAR; INTRAVENOUS ONCE
Status: COMPLETED | OUTPATIENT
Start: 2025-06-24 | End: 2025-06-24

## 2025-06-24 RX ORDER — MORPHINE SULFATE 2 MG/ML
4 INJECTION, SOLUTION INTRAMUSCULAR; INTRAVENOUS ONCE
Status: COMPLETED | OUTPATIENT
Start: 2025-06-24 | End: 2025-06-24

## 2025-06-24 RX ORDER — KETOROLAC TROMETHAMINE 30 MG/ML
30 INJECTION, SOLUTION INTRAMUSCULAR; INTRAVENOUS ONCE
Status: COMPLETED | OUTPATIENT
Start: 2025-06-24 | End: 2025-06-24

## 2025-06-24 RX ORDER — PANTOPRAZOLE SODIUM 40 MG/1
40 TABLET, DELAYED RELEASE ORAL
Status: DISCONTINUED | OUTPATIENT
Start: 2025-06-25 | End: 2025-06-25 | Stop reason: HOSPADM

## 2025-06-24 RX ORDER — SODIUM CHLORIDE 0.9 % (FLUSH) 0.9 %
10 SYRINGE (ML) INJECTION EVERY 12 HOURS SCHEDULED
Status: DISCONTINUED | OUTPATIENT
Start: 2025-06-24 | End: 2025-06-25 | Stop reason: HOSPADM

## 2025-06-24 RX ORDER — IOPAMIDOL 755 MG/ML
100 INJECTION, SOLUTION INTRAVASCULAR
Status: COMPLETED | OUTPATIENT
Start: 2025-06-24 | End: 2025-06-24

## 2025-06-24 RX ORDER — DIAZEPAM 10 MG/2ML
5 INJECTION, SOLUTION INTRAMUSCULAR; INTRAVENOUS ONCE
Status: COMPLETED | OUTPATIENT
Start: 2025-06-24 | End: 2025-06-25

## 2025-06-24 RX ORDER — ROSUVASTATIN CALCIUM 20 MG/1
10 TABLET, COATED ORAL NIGHTLY
Status: DISCONTINUED | OUTPATIENT
Start: 2025-06-24 | End: 2025-06-25 | Stop reason: HOSPADM

## 2025-06-24 RX ORDER — SODIUM CHLORIDE 0.9 % (FLUSH) 0.9 %
10 SYRINGE (ML) INJECTION AS NEEDED
Status: DISCONTINUED | OUTPATIENT
Start: 2025-06-24 | End: 2025-06-25 | Stop reason: HOSPADM

## 2025-06-24 RX ORDER — PROCHLORPERAZINE EDISYLATE 5 MG/ML
10 INJECTION INTRAMUSCULAR; INTRAVENOUS EVERY 6 HOURS PRN
Status: DISCONTINUED | OUTPATIENT
Start: 2025-06-24 | End: 2025-06-25 | Stop reason: HOSPADM

## 2025-06-24 RX ORDER — ALBUTEROL SULFATE 0.83 MG/ML
2.5 SOLUTION RESPIRATORY (INHALATION) EVERY 6 HOURS PRN
Status: DISCONTINUED | OUTPATIENT
Start: 2025-06-24 | End: 2025-06-25 | Stop reason: HOSPADM

## 2025-06-24 RX ORDER — MAGNESIUM SULFATE HEPTAHYDRATE 40 MG/ML
2 INJECTION, SOLUTION INTRAVENOUS ONCE
Status: COMPLETED | OUTPATIENT
Start: 2025-06-24 | End: 2025-06-24

## 2025-06-24 RX ORDER — SODIUM CHLORIDE 9 MG/ML
100 INJECTION, SOLUTION INTRAVENOUS CONTINUOUS
Status: ACTIVE | OUTPATIENT
Start: 2025-06-24 | End: 2025-06-25

## 2025-06-24 RX ORDER — NITROGLYCERIN 0.4 MG/1
0.4 TABLET SUBLINGUAL
Status: DISCONTINUED | OUTPATIENT
Start: 2025-06-24 | End: 2025-06-25 | Stop reason: HOSPADM

## 2025-06-24 RX ORDER — MAGNESIUM SULFATE 1 G/100ML
1 INJECTION INTRAVENOUS ONCE
Status: COMPLETED | OUTPATIENT
Start: 2025-06-24 | End: 2025-06-24

## 2025-06-24 RX ORDER — SODIUM CHLORIDE 9 MG/ML
40 INJECTION, SOLUTION INTRAVENOUS AS NEEDED
Status: DISCONTINUED | OUTPATIENT
Start: 2025-06-24 | End: 2025-06-25 | Stop reason: HOSPADM

## 2025-06-24 RX ADMIN — MAGNESIUM SULFATE HEPTAHYDRATE 2 G: 40 INJECTION, SOLUTION INTRAVENOUS at 17:35

## 2025-06-24 RX ADMIN — PROCHLORPERAZINE EDISYLATE 10 MG: 5 INJECTION INTRAMUSCULAR; INTRAVENOUS at 11:42

## 2025-06-24 RX ADMIN — ROSUVASTATIN CALCIUM 10 MG: 20 TABLET, FILM COATED ORAL at 21:06

## 2025-06-24 RX ADMIN — DROPERIDOL 2.5 MG: 2.5 INJECTION, SOLUTION INTRAMUSCULAR; INTRAVENOUS at 15:54

## 2025-06-24 RX ADMIN — SODIUM CHLORIDE 100 ML/HR: 9 INJECTION, SOLUTION INTRAVENOUS at 17:35

## 2025-06-24 RX ADMIN — MAGNESIUM SULFATE IN DEXTROSE 1 G: 10 INJECTION, SOLUTION INTRAVENOUS at 12:05

## 2025-06-24 RX ADMIN — DIPHENHYDRAMINE HYDROCHLORIDE 25 MG: 50 INJECTION, SOLUTION INTRAMUSCULAR; INTRAVENOUS at 22:00

## 2025-06-24 RX ADMIN — IOPAMIDOL 90 ML: 755 INJECTION, SOLUTION INTRAVENOUS at 11:53

## 2025-06-24 RX ADMIN — KETOROLAC TROMETHAMINE 30 MG: 30 INJECTION INTRAMUSCULAR; INTRAVENOUS at 22:00

## 2025-06-24 RX ADMIN — MORPHINE SULFATE 4 MG: 2 INJECTION, SOLUTION INTRAMUSCULAR; INTRAVENOUS at 16:44

## 2025-06-24 RX ADMIN — DIPHENHYDRAMINE HYDROCHLORIDE 25 MG: 50 INJECTION, SOLUTION INTRAMUSCULAR; INTRAVENOUS at 11:42

## 2025-06-24 RX ADMIN — PROCHLORPERAZINE EDISYLATE 10 MG: 5 INJECTION INTRAMUSCULAR; INTRAVENOUS at 22:00

## 2025-06-24 NOTE — ED PROVIDER NOTES
EMERGENCY DEPARTMENT ENCOUNTER    Room Number:  19/19  PCP: Rafael Gomez  Historian: Patient       HPI:  Chief Complaint: Headache, double vision, nausea  A complete HPI/ROS/PMH/PSH/SH/FH are unobtainable due to: None    Context: Leah Nova is a 65 y.o. female who presents to the ED via [facility] from home c/o acute persistent right-sided headache for about 1 week with double vision and nausea.  Was seen at [facility] couple days ago and had a reassuring CT scan of the head that showed possible sinusitis so she was placed on antibiotics.  Over-the-counter medications for migraine headache have been ineffective.  No prior similar episodes in the past.  Denies any focal numbness or weakness of the arms or legs, no significant balance issues or dizziness.  Has had 3 weeks of diarrhea as well.  No recent falls or head injuries.  Pain mostly in the right face and right side of her head.       MEDICAL RECORD REVIEW    External (non-ED) record review: CT head result reviewed from June 22, 2025, no acute intracranial abnormality noted, acute appearing sphenoid sinus disease with mucosal thickening in the maxillary sinuses.  Ethmoid and frontal sinuses as well as mastoid air cells are clear.               PAST MEDICAL HISTORY  Active Ambulatory Problems     Diagnosis Date Noted    Therapeutic opioid induced constipation 04/17/2024    GERD (gastroesophageal reflux disease) 04/17/2024    Dyspepsia 04/21/2024    Chronic diarrhea 04/21/2024    Nausea and vomiting 04/21/2024    Irritable bowel syndrome with diarrhea 04/21/2024    Umbilical hernia without obstruction and without gangrene 08/05/2024    Obesity (BMI 30.0-34.9) 08/05/2024     Resolved Ambulatory Problems     Diagnosis Date Noted    No Resolved Ambulatory Problems     Past Medical History:   Diagnosis Date    Cervical cancer     Chronic pain     COPD (chronic obstructive pulmonary disease)     Depression     History of panic attacks      Hyperlipidemia     Injury of back     Skin cancer     Umbilical hernia          PAST SURGICAL HISTORY  Past Surgical History:   Procedure Laterality Date    ANTERIOR CERVICAL DISCECTOMY W/ FUSION      X2    APPENDECTOMY      BLADDER SUSPENSION      COLONOSCOPY N/A 05/06/2024    Procedure: COLONOSCOPY;  Surgeon: Han Lawler MD;  Location: [facility] MAIN OR;  Service: Gastroenterology;  Laterality: N/A;  POLYPS, INTERNAL HEMORRHOIDS    ENDOSCOPY N/A 05/06/2024    Procedure: ESOPHAGOGASTRODUODENOSCOPY;  Surgeon: Han Lawler MD;  Location: [facility] MAIN OR;  Service: Gastroenterology;  Laterality: N/A;  GASTRITIS    HYSTERECTOMY      LUMBAR DISCECTOMY FUSION INSTRUMENTATION      SPINAL CORD STIMULATOR IMPLANT      HAS SINCE BEEN REMOVED    TONSILLECTOMY      TUBAL ABDOMINAL LIGATION      UMBILICAL HERNIA REPAIR N/A 9/6/2024    Procedure: UMBILICAL HERNIA REPAIR, with mesh placement;  Surgeon: Bradley Purcell MD;  Location: [facility] MAIN OR;  Service: General;  Laterality: N/A;         FAMILY HISTORY  Family History   Problem Relation Age of Onset    Malig Hyperthermia Neg Hx          SOCIAL HISTORY  Social History     Socioeconomic History    Marital status: Single   Tobacco Use    Smoking status: Every Day     Current packs/day: 0.50     Average packs/day: 0.5 packs/day for 0.1 years (0.1 ttl pk-yrs)     Types: Cigarettes     Start date: 05/2025     Passive exposure: Current (former smoker quit in April, had a life event that made her start smoking again.)    Smokeless tobacco: Never    Tobacco comments:     39 YEAR 1/2 PPD SMOKER   Vaping Use    Vaping status: Never Used   Substance and Sexual Activity    Alcohol use: No    Drug use: No    Sexual activity: Defer         ALLERGIES  Prozac [fluoxetine]        REVIEW OF SYSTEMS  Review of Systems     All systems reviewed and negative except for those discussed in HPI.       PHYSICAL EXAM    I have reviewed the triage vital signs and nursing notes.    ED Triage  Vitals [06/24/25 1023]   Temp Heart Rate Resp BP SpO2   98.2 °F (36.8 °C) 84 18 126/66 96 %      Temp src Heart Rate Source Patient Position BP Location FiO2 (%)   Oral Monitor -- -- --       Physical Exam  General: Appears uncomfortable, nondiaphoretic  HEENT: Mucous membranes moist, atraumatic, EOMI, right EAC and TM clear without signs of otitis media or otitis externa  Neck: Full ROM  Pulm: Symmetric chest rise, nonlabored, lungs CTAB  Cardiovascular: Regular rate and rhythm, intact distal pulses  GI: Soft, nontender, nondistended, no rebound, no guarding, bowel sounds present  MSK: Full ROM, no deformity  Skin: Warm, dry  Neuro: Awake, alert, oriented x 4, GCS 15, no facial droop, no dysarthria or aphasia, binocular double vision noted, moving all extremities  Psych: Calm, cooperative          LAB RESULTS  Recent Results (from the past 24 hours)   Comprehensive Metabolic Panel    Collection Time: 06/24/25 11:09 AM    Specimen: Blood   Result Value Ref Range    Glucose 94 65 - 99 mg/dL    BUN 4.0 (L) 8.0 - 23.0 mg/dL    Creatinine 0.79 0.57 - 1.00 mg/dL    Sodium 143 136 - 145 mmol/L    Potassium 4.4 3.5 - 5.2 mmol/L    Chloride 105 98 - 107 mmol/L    CO2 27.4 22.0 - 29.0 mmol/L    Calcium 9.4 8.6 - 10.5 mg/dL    Total Protein 7.7 6.0 - 8.5 g/dL    Albumin 4.5 3.5 - 5.2 g/dL    ALT (SGPT) 11 1 - 33 U/L    AST (SGOT) 16 1 - 32 U/L    Alkaline Phosphatase 104 39 - 117 U/L    Total Bilirubin 0.3 0.0 - 1.2 mg/dL    Globulin 3.2 gm/dL    A/G Ratio 1.4 g/dL    BUN/Creatinine Ratio 5.1 (L) 7.0 - 25.0    Anion Gap 10.6 5.0 - 15.0 mmol/L    eGFR 82.6 >60.0 mL/min/1.73   High Sensitivity Troponin T    Collection Time: 06/24/25 11:09 AM    Specimen: Blood   Result Value Ref Range    HS Troponin T 9 <14 ng/L   Magnesium    Collection Time: 06/24/25 11:09 AM    Specimen: Blood   Result Value Ref Range    Magnesium 2.3 1.6 - 2.4 mg/dL   CBC Auto Differential    Collection Time: 06/24/25 11:09 AM    Specimen: Blood   Result  Value Ref Range    WBC 8.27 3.40 - 10.80 10*3/mm3    RBC 4.58 3.77 - 5.28 10*6/mm3    Hemoglobin 14.0 12.0 - 15.9 g/dL    Hematocrit 43.2 34.0 - 46.6 %    MCV 94.3 79.0 - 97.0 fL    MCH 30.6 26.6 - 33.0 pg    MCHC 32.4 31.5 - 35.7 g/dL    RDW 13.5 12.3 - 15.4 %    RDW-SD 46.8 37.0 - 54.0 fl    MPV 9.0 6.0 - 12.0 fL    Platelets 257 140 - 450 10*3/mm3    Neutrophil % 43.8 42.7 - 76.0 %    Lymphocyte % 43.0 19.6 - 45.3 %    Monocyte % 7.6 5.0 - 12.0 %    Eosinophil % 4.1 0.3 - 6.2 %    Basophil % 1.1 0.0 - 1.5 %    Immature Grans % 0.4 0.0 - 0.5 %    Neutrophils, Absolute 3.62 1.70 - 7.00 10*3/mm3    Lymphocytes, Absolute 3.56 (H) 0.70 - 3.10 10*3/mm3    Monocytes, Absolute 0.63 0.10 - 0.90 10*3/mm3    Eosinophils, Absolute 0.34 0.00 - 0.40 10*3/mm3    Basophils, Absolute 0.09 0.00 - 0.20 10*3/mm3    Immature Grans, Absolute 0.03 0.00 - 0.05 10*3/mm3    nRBC 0.0 0.0 - 0.2 /100 WBC   Sedimentation Rate    Collection Time: 06/24/25 11:09 AM    Specimen: Blood   Result Value Ref Range    Sed Rate 9 0 - 30 mm/hr   High Sensitivity Troponin T 1Hr    Collection Time: 06/24/25 12:08 PM    Specimen: Arm, Left; Blood   Result Value Ref Range    HS Troponin T 7 <14 ng/L    Troponin T Numeric Delta -2 Abnormal if >/=3 ng/L   Urinalysis With Microscopic If Indicated (No Culture) - Urine, Clean Catch    Collection Time: 06/24/25  3:17 PM    Specimen: Urine, Clean Catch   Result Value Ref Range    Color, UA Yellow Yellow, Straw    Appearance, UA Clear Clear    pH, UA 7.0 5.0 - 8.0    Specific Gravity, UA >1.030 (H) 1.005 - 1.030    Glucose, UA Negative Negative    Ketones, UA Negative Negative    Bilirubin, UA Negative Negative    Blood, UA Moderate (2+) (A) Negative    Protein, UA Negative Negative    Leuk Esterase, UA Negative Negative    Nitrite, UA Negative Negative    Urobilinogen, UA 0.2 E.U./dL 0.2 - 1.0 E.U./dL   Urinalysis, Microscopic Only - Urine, Clean Catch    Collection Time: 06/24/25  3:17 PM    Specimen: Urine,  Clean Catch   Result Value Ref Range    RBC, UA 11-20 (A) None Seen, 0-2 /HPF    WBC, UA 0-2 None Seen, 0-2 /HPF    Bacteria, UA None Seen None Seen /HPF    Squamous Epithelial Cells, UA 3-6 (A) None Seen, 0-2 /HPF    Hyaline Casts, UA None Seen None Seen /LPF    Methodology Automated Microscopy        Ordered the above labs and independently interpreted results. My findings will be discussed in the medical decision making section below        RADIOLOGY  CT Angiogram Neck  Result Date: 6/24/2025  CT ANGIOGRAM NECK AND HEAD WITH CONTRAST  HISTORY: Severe headache, double vision.  COMPARISON: None.  FINDINGS: The brain and ventricles are symmetrical. There is no evidence of hemorrhage, hydrocephalus or of abnormal extra-axial fluid. No focal area of decreased attenuation to suggest acute infarction is identified. Mild mucosal thickening involving the sphenoid sinus to the right as well as a small volume of fluid involving the sphenoid sinus to the right is noted.  A CT angiogram of the neck and head was then performed. Multiplanar as well as three-dimensional reconstructions were generated. Emphysematous changes involving the lung apices are appreciated bilaterally. The great vessels are arranged in a classic configuration. There is 0% stenosis of the internal carotid arteries using NASCET criteria. Mild vascular calcification involving the carotid siphons is appreciated bilaterally. 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 The right A1 segment is hypoplastic. There is infundibular prominence involving the left A2/A3 junction.  The proximal aspects of the anterior and middle cerebral arteries appear otherwise unremarkable. Both vertebral arteries were opacified. The right vertebral artery is slightly larger than that of the left. The basilar artery and the proximal aspects of the posterior cerebral arteries appear unremarkable. Beam hardening artifact from the patient's  shoulders limits evaluation of the thyroid, but there is a nodular exophytic appearance of the inferior aspect of the left lobe of the thyroid measuring approximately 12 x 12 mm in size.      Note: This is a preliminary report. The 3-dimensional reconstructions are not yet available for review. There is no evidence of acute infarction or of intracranial hemorrhage. Further evaluation could be performed with MRI examination of the brain. 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 There is nodular prominence of the inferior aspect of the left lobe of the thyroid gland, obscured by beam hardening artifact from the patient's shoulders. A thyroid ultrasound is recommended. The above information was called and discussed with Dr. Quinones.  CHECK CHECK preliminary report. Radiation dose reduction techniques were utilized, including automated exposure control and exposure modulation based on body size.        CT Angiogram Head  Result Date: 6/24/2025  The report for the CT angiogram of the head was included in the report for the CT angiogram of the neck.   Radiation dose reduction techniques were utilized, including automated exposure control and exposure modulation based on body size.   This report was finalized on 6/24/2025 1:24 PM by Dr. Suhas Manuel M.D on Workstation: [id number]        Ordered the above noted radiological studies.  Independently interpreted by me and my independent review of findings can be found in the ED Course.  See dictation for official radiology interpretation.      PROCEDURES    Procedures        MEDICATIONS GIVEN IN ER    Medications   morphine injection 4 mg (has no administration in time range)   prochlorperazine (COMPAZINE) injection 10 mg (10 mg Intravenous Given 6/24/25 1142)   diphenhydrAMINE (BENADRYL) injection 25 mg (25 mg Intravenous Given 6/24/25 1142)   magnesium sulfate in D5W 1g/100mL (PREMIX) (0 g Intravenous Stopped 6/24/25 1313)   iopamidol (ISOVUE-370) 76 %  injection 100 mL (90 mL Intravenous Given by Other 6/24/25 1153)   droperidol (INAPSINE) injection 2.5 mg (2.5 mg Intravenous Given 6/24/25 1554)         PROGRESS, DATA ANALYSIS, CONSULTS, AND MEDICAL DECISION MAKING    Please note that this section constitutes my independent interpretation of clinical data including lab results, radiology, EKG's.  This constitutes my independent professional opinion regarding differential diagnosis and management of this patient.  It may include any factors such as history from outside sources, review of external records, social determinants of health, management of medications, response to those treatments, and discussions with other providers.    Differential Diagnosis and Plan: Initial concern for migraine headache, intracranial mass, temporal arteritis, trigeminal neuralgia, facial pain secondary to sinusitis, among others.  Plan for CTA head and neck, symptomatic care, labs, and reevaluate.    Additional sources:  - Discussed/ obtained information from independent historians: EMS reports blood glucose of 112 prior to arrival     - (Social Determinants of Health): None     - Shared decision making:  Patient fully updated on and in agreement with the course and plan moving forward    ED Course as of 06/24/25 1621   Tue Jun 24, 2025   1159 Glucose: 94 [DC]   1159 BUN(!): 4.0 [DC]   1159 Creatinine: 0.79 [DC]   1159 Sodium: 143 [DC]   1159 Potassium: 4.4 [DC]   1159 ALT (SGPT): 11 [DC]   1159 AST (SGOT): 16 [DC]   1159 Alkaline Phosphatase: 104 [DC]   1159 Total Bilirubin: 0.3 [DC]   1159 Sed Rate: 9 [DC]   1159 Magnesium: 2.3 [DC]   1159 HS Troponin T: 9 [DC]   1159 WBC: 8.27 [DC]   1159 Hemoglobin: 14.0 [DC]   1159 Platelets: 257 [DC]   1328 CT Angiogram Head  Discussed with Dr. Manuel, no acute ICH or mass [DC]   1355 CT Angiogram Neck  Radiology report reviewed, no evidence of acute infarct or intracranial hemorrhage, 3 mm left ophthalmic segment aneurysm appreciated, nodular  prominence at the inferior aspect of the left lobe of thyroid gland [DC]   1619 Discussed with Dr. Cardoza, Neurology, discussed patient's clinical course and findings today, treatment modalities, and he will consult. [DC]   1620 Discussed with Bria Lucio, NAVID, discussed patient's clinical course and findings today, treatment modalities, neurology consult and recommendations, and need for hospital stay. [DC]   1620 Not making significant treatment with medications at this point, plan for hospitalization for further symptom control and neurology consultation and MRI.  She is fully updated on the course and plan and need for hospital stay. [DC]      ED Course User Index  [DC] Bucky Quinones MD       Hospitalization Considered?: yes    Orders Placed During This Visit:  Orders Placed This Encounter   Procedures    CT Angiogram Head    CT Angiogram Neck    MRI Brain With & Without Contrast    Comprehensive Metabolic Panel    Urinalysis With Microscopic If Indicated (No Culture) - Urine, Clean Catch    High Sensitivity Troponin T    Magnesium    CBC Auto Differential    Sedimentation Rate    High Sensitivity Troponin T 1Hr    Urinalysis, Microscopic Only - Urine, Clean Catch    C-reactive Protein    Inpatient Neurology Consult General    Initiate Emergency Department Observation Status    CBC & Differential       Additional orders considered but not placed:      Independent interpretation of labs, radiology studies, and discussions with consultants: See ED Course        AS OF 16:21 EDT VITALS:    BP - 127/99  HR - 79  TEMP - 98.2 °F (36.8 °C) (Oral)  02 SATS - 92%          DIAGNOSIS  Final diagnoses:   Acute intractable headache, unspecified headache type   Double vision         DISPOSITION  ED Disposition       ED Disposition   Decision to Admit    Condition   --    Comment   --                Please note that portions of this document were completed with a voice recognition program.    Note Disclaimer: At [facility], we believe that sharing information builds trust and better relationships. You are receiving this note because you recently visited [facility]. It is possible you will see health information before a provider has talked with you about it. This kind of information can be easy to misunderstand. To help you fully understand what it means for your health, we urge you to discuss this note with your provider.                       Bucky Quinones MD  06/24/25 5310

## 2025-06-24 NOTE — ED NOTES
Nursing report ED to floor  Leah Nova  65 y.o.  female    HPI :  HPI  Stated Reason for Visit: HA, N/v/d    Chief Complaint  Chief Complaint   Patient presents with    Headache    Diarrhea       Admitting doctor:   David Hawkins MD    Admitting diagnosis:   The primary encounter diagnosis was Acute intractable headache, unspecified headache type. A diagnosis of Double vision was also pertinent to this visit.    Code status:   Current Code Status       Date Active Code Status Order ID Comments User Context       Not on file            Allergies:   Prozac [fluoxetine]    Isolation:   No active isolations    Intake and Output    Intake/Output Summary (Last 24 hours) at 6/24/2025 1637  Last data filed at 6/24/2025 1313  Gross per 24 hour   Intake 100 ml   Output --   Net 100 ml       Weight:       06/24/25  1023   Weight: 79.4 kg (175 lb)       Most recent vitals:   Vitals:    06/24/25 1100 06/24/25 1209 06/24/25 1300 06/24/25 1600   BP:  143/73 134/75 127/99   BP Location:  Right arm     Patient Position:  Lying     Pulse: 80 68 84 79   Resp:  22     Temp:       TempSrc:       SpO2: 92% 91% 92%    Weight:       Height:           Active LDAs/IV Access:   Lines, Drains & Airways       Active LDAs       Name Placement date Placement time Site Days    Peripheral IV 06/24/25 1055 20 G Left Antecubital 06/24/25  1055  Antecubital  less than 1                    Labs (abnormal labs have a star):   Labs Reviewed   COMPREHENSIVE METABOLIC PANEL - Abnormal; Notable for the following components:       Result Value    BUN 4.0 (*)     BUN/Creatinine Ratio 5.1 (*)     All other components within normal limits    Narrative:     GFR Categories in Chronic Kidney Disease (CKD)              GFR Category          GFR (mL/min/1.73)    Interpretation  G1                    90 or greater        Normal or high (1)  G2                    60-89                Mild decrease (1)  G3a                   45-59                Mild to moderate  decrease  G3b                   30-44                Moderate to severe decrease  G4                    15-29                Severe decrease  G5                    14 or less           Kidney failure    (1)In the absence of evidence of kidney disease, neither GFR category G1 or G2 fulfill the criteria for CKD.    eGFR calculation 2021 CKD-EPI creatinine equation, which does not include race as a factor   URINALYSIS W/ MICROSCOPIC IF INDICATED (NO CULTURE) - Abnormal; Notable for the following components:    Specific Gravity, UA >1.030 (*)     Blood, UA Moderate (2+) (*)     All other components within normal limits   CBC WITH AUTO DIFFERENTIAL - Abnormal; Notable for the following components:    Lymphocytes, Absolute 3.56 (*)     All other components within normal limits   URINALYSIS, MICROSCOPIC ONLY - Abnormal; Notable for the following components:    RBC, UA 11-20 (*)     Squamous Epithelial Cells, UA 3-6 (*)     All other components within normal limits   TROPONIN - Normal    Narrative:     High Sensitive Troponin T Reference Range:  <14.0 ng/L- Negative Female for AMI  <22.0 ng/L- Negative Male for AMI  >=14 - Abnormal Female indicating possible myocardial injury.  >=22 - Abnormal Male indicating possible myocardial injury.   Clinicians would have to utilize clinical acumen, EKG, Troponin, and serial changes to determine if it is an Acute Myocardial Infarction or myocardial injury due to an underlying chronic condition.        MAGNESIUM - Normal   SEDIMENTATION RATE - Normal   HIGH SENSITIVITIY TROPONIN T 1HR - Normal    Narrative:     High Sensitive Troponin T Reference Range:  <14.0 ng/L- Negative Female for AMI  <22.0 ng/L- Negative Male for AMI  >=14 - Abnormal Female indicating possible myocardial injury.  >=22 - Abnormal Male indicating possible myocardial injury.   Clinicians would have to utilize clinical acumen, EKG, Troponin, and serial changes to determine if it is an Acute Myocardial Infarction or  myocardial injury due to an underlying chronic condition.        C-REACTIVE PROTEIN   CBC AND DIFFERENTIAL    Narrative:     The following orders were created for panel order CBC & Differential.  Procedure                               Abnormality         Status                     ---------                               -----------         ------                     CBC Auto Differential[[id number]]        Abnormal            Final result                 Please view results for these tests on the individual orders.       EKG:   No orders to display       Meds given in ED:   Medications   morphine injection 4 mg (has no administration in time range)   prochlorperazine (COMPAZINE) injection 10 mg (10 mg Intravenous Given 6/24/25 1142)   diphenhydrAMINE (BENADRYL) injection 25 mg (25 mg Intravenous Given 6/24/25 1142)   magnesium sulfate in D5W 1g/100mL (PREMIX) (0 g Intravenous Stopped 6/24/25 1313)   iopamidol (ISOVUE-370) 76 % injection 100 mL (90 mL Intravenous Given by Other 6/24/25 1153)   droperidol (INAPSINE) injection 2.5 mg (2.5 mg Intravenous Given 6/24/25 1554)       Imaging results:  CT Angiogram Neck  Result Date: 6/24/2025  Note: This is a preliminary report. The 3-dimensional reconstructions are not yet available for review. There is no evidence of acute infarction or of intracranial hemorrhage. Further evaluation could be performed with MRI examination of the brain. 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 There is nodular prominence of the inferior aspect of the left lobe of the thyroid gland, obscured by beam hardening artifact from the patient's shoulders. A thyroid ultrasound is recommended. The above information was called and discussed with Dr. Quinones.  CHECK CHECK preliminary report. Radiation dose reduction techniques were utilized, including automated exposure control and exposure modulation based on body size.          Ambulatory status:     Ad cori    Social issues:   Social  History     Socioeconomic History    Marital status: Single   Tobacco Use    Smoking status: Every Day     Current packs/day: 0.50     Average packs/day: 0.5 packs/day for 0.1 years (0.1 ttl pk-yrs)     Types: Cigarettes     Start date: 05/2025     Passive exposure: Current (former smoker quit in April, had a life event that made her start smoking again.)    Smokeless tobacco: Never    Tobacco comments:     40 YEAR 1/2 PPD SMOKER   Vaping Use    Vaping status: Never Used   Substance and Sexual Activity    Alcohol use: No    Drug use: No    Sexual activity: Defer       Peripheral Neurovascular  Peripheral Neurovascular (Adult)  Peripheral Neurovascular WDL: pulse assessment  Pulse Assessment: dorsalis pedis    Neuro Cognitive  Neuro Cognitive (Adult)  Cognitive/Neuro/Behavioral WDL: WDL, level of consciousness, orientation, speech  Level of Consciousness: Alert  Orientation: oriented x 4  Speech: clear, spontaneous, logical  Headache Assessment  Headache Location: frontal, temporal  (0-10) Headache Severity Rating: 10  Description/Character: pressure, throbbing  Associated Signs/Symptoms: other (see comments) (blurry vision)  Pupils  Pupil PERRLA: yes  Sedation Group  POSS (Pasero Opioid-Induced Sed Scale): 1 - Awake and alert    Learning  Learning Assessment  Learning Readiness and Ability: no barriers identified  Education Provided  Person Taught: patient  Teaching Method: verbal instruction  Teaching Focus: risk factors/triggers, self-management, symptom/problem overview  Education Outcome Evaluation: acceptance expressed    Respiratory  Respiratory WDL  Respiratory WDL: WDL    Abdominal Pain       Pain Assessments  Pain (Adult)  (0-10) Pain Rating: Rest: 9  (0-10) Pain Rating: Activity: 9  Pain Location: head  Pain Side/Orientation: generalized  Response to Pain Interventions: intervention not effective per patient    NIH Stroke Scale       Alfredo Philip RN  06/24/25 16:37 EDT

## 2025-06-24 NOTE — ED TRIAGE NOTES
Pt was brought in by ems from home for headache with blurry vision that started this weekend. Pt was seen at urgent care and er and placed on abs with no relief. Pt also resports n/vd

## 2025-06-24 NOTE — H&P
[facility]   HISTORY AND PHYSICAL    Patient Name: Leah Nova  : 1960  MRN: [id number]  Primary Care Physician:  Rafael Gomez  Date of admission: 2025    Subjective   Subjective     Chief Complaint:   Chief Complaint   Patient presents with    Headache    Diarrhea     HPI:    Leah Nova is a 65 y.o. female with a history of GERD, COPD, chronic diarrhea, IBS-D who presented to the emergency department with intractable right-sided headache x 6 days.  Reports associated nausea, blurry and double vision, photophobia.  No vomiting.  Denies unilateral weakness/numbness, no abnormal speech.  She is a smoker.  Uses marijuana occasionally.  Denies alcohol use.  She reports her daughter was recently discharged from the hospital after being found unresponsive.  She is also in the process of moving and has to be out of her home by the end of next month.  Her primary care doctor recently started her on sertraline, however she stopped taking it due to side effects.  She has been taking over-the-counter pain medications with minimal improvement with her symptoms.    High-sensitivity troponin was 9, 7 with delta of -2.  CBC and CMP largely unremarkable.  ESR and CRP were normal.  Urine noninfected.  CTA head and neck shows no evidence of acute infarct or intracranial hemorrhage.  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  Thyroid nodule noted as well, outpatient thyroid ultrasound recommended.    She will be admitted to the ED observation for further management.  Neurology has been consulted.  Will get an MRI with and without contrast.    Review of Systems   All systems were reviewed and negative except for: Those mentioned in HPI    Personal History     Past Medical History:   Diagnosis Date    Cervical cancer     years ago    Chronic pain     WITH NECK BACK AND LEGS    COPD (chronic obstructive pulmonary disease)     Depression     GERD (gastroesophageal  reflux disease)     History of panic attacks     Hyperlipidemia     Injury of back     Skin cancer     BCC REMOVED FROM FACE ABOVE LIP. SQUAMOUS CELL REMOVED RIGHT LEG    Umbilical hernia        Past Surgical History:   Procedure Laterality Date    ANTERIOR CERVICAL DISCECTOMY W/ FUSION      X2    APPENDECTOMY      BLADDER SUSPENSION      COLONOSCOPY N/A 05/06/2024    Procedure: COLONOSCOPY;  Surgeon: Han Lawler MD;  Location: [facility] MAIN OR;  Service: Gastroenterology;  Laterality: N/A;  POLYPS, INTERNAL HEMORRHOIDS    ENDOSCOPY N/A 05/06/2024    Procedure: ESOPHAGOGASTRODUODENOSCOPY;  Surgeon: Han Lawler MD;  Location: [facility] MAIN OR;  Service: Gastroenterology;  Laterality: N/A;  GASTRITIS    HYSTERECTOMY      LUMBAR DISCECTOMY FUSION INSTRUMENTATION      SPINAL CORD STIMULATOR IMPLANT      HAS SINCE BEEN REMOVED    TONSILLECTOMY      TUBAL ABDOMINAL LIGATION      UMBILICAL HERNIA REPAIR N/A 09/06/2024    Procedure: UMBILICAL HERNIA REPAIR, with mesh placement;  Surgeon: Bradley Purcell MD;  Location: [facility] MAIN OR;  Service: General;  Laterality: N/A;       Family History: family history is not on file. Otherwise pertinent FHx was reviewed and not pertinent to current issue.    Social History:  reports that she has been smoking cigarettes. She started smoking about 7 weeks ago. She has a 0.1 pack-year smoking history. She has been exposed to tobacco smoke. She has never used smokeless tobacco. She reports that she does not drink alcohol and does not use drugs.    Home Medications:  Mirabegron ER, Tenapanor HCl, albuterol, albuterol sulfate HFA, diphenhydrAMINE-acetaminophen, omeprazole, and rosuvastatin    Allergies:  Allergies   Allergen Reactions    Prozac [Fluoxetine] Other (See Comments)     Homicidal, depressed, confusion       Objective   Objective     Vitals:   Temp:  [98.2 °F (36.8 °C)] 98.2 °F (36.8 °C)  Heart Rate:  [68-84] 75  Resp:  [18-22] 22  BP: (126-143)/(66-99) 127/99  Physical  Exam    Constitutional: Awake, alert   Eyes: PERRLA, sclerae anicteric, no conjunctival injection   HENT: NCAT, mucous membranes moist   Neck: Supple, no thyromegaly, no lymphadenopathy, trachea midline   Respiratory: Clear to auscultation bilaterally, nonlabored respirations    Cardiovascular: RRR, no murmurs, rubs, or gallops, palpable pedal pulses bilaterally   Gastrointestinal: Positive bowel sounds, soft, nontender, nondistended   Musculoskeletal: No bilateral ankle edema, no clubbing or cyanosis to extremities   Psychiatric: Appropriate affect, cooperative   Neurologic: Oriented x 3, strength symmetric in all extremities, Cranial Nerves grossly intact to confrontation, speech clear   Skin: No rashes     Result Review    Result Review:  I have personally reviewed the results from the time of this admission to 6/24/2025 17:16 EDT and agree with these findings:  [x]  Laboratory list / accordion  []  Microbiology  [x]  Radiology  [x]  EKG/Telemetry   []  Cardiology/Vascular   []  Pathology  []  Old records  []  Other:  Most notable findings include: BUN 4, BUN/creatinine ratio 5.1,    Assessment / Plan     Brief Patient Summary:  Leah Nova is a 65 y.o. female who will be admitted to the observation unit for further management due to intractable headache.  Neurology has been consulted.  Will get an MRI brain with and without contrast.    Active Hospital Problems:  Active Hospital Problems    Diagnosis     **Intractable headache      Plan:     Intractable headache  Abnormal vision  Continues telemetry monitoring  Vital signs and neurochecks per nurse routine  CTA head neck pending official read, no acute infarct or hemorrhage  Consult neurology  MRI brain with and without contrast  Migraine cocktails as needed  Magnesium 2 g x 1 now  IV hydration    Aneurysm  MRI pending  Consider neurosurgery consultation    Thyroid nodules  Outpatient thyroid ultrasound recommended    VTE Prophylaxis:  No VTE prophylaxis  order currently exists.    CODE STATUS:       Admission Status:  I believe this patient meets observation status.    Electronically signed by BEVERLY Allen, 06/24/25, 5:16 PM EDT.    75 minutes has been spent by [facility] providers in the care of this patient while under observation status on this date 06/24/25    I have worn appropriate PPE during this patient encounter, sanitized my hands both with entering and exiting patient's room.    I have discussed plan of care with patient including advance care plan and/or surrogate decision maker.  Patient advises that their daughter will be their primary surrogate decision maker

## 2025-06-24 NOTE — PROGRESS NOTES
YENNY VALVERDE ATTESTATION NOTE    The NAVID and I have discussed this patient's history, physical exam, and treatment plan.  I have reviewed the documentation and personally had a face to face interaction with the patient. I affirm the documentation and agree with the treatment and plan.  The attached note describes my personal findings.      I provided a substantive portion of the care of this patient. I personally performed the physical exam, in its entirety. I personally spent 23 minutes on the care of this patient today.    Leah Nova is a 65 y.o. female who presented to the emergency department today complaining of of right-sided headache.  CTA of the head and neck were negative.  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  The patient required admission to the observation unit for further evaluation and management.  The patient reports her headache is down to 3 out of 10.  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          On exam:  GENERAL: Awake, alert, no acute distress  SKIN: Warm, dry  HENT: Normocephalic, atraumatic  EYES: no scleral icterus  CV: regular rhythm, regular rate  RESPIRATORY: normal effort, lungs clear  ABDOMEN: soft, nontender, nondistended  MUSCULOSKELETAL: no deformity  NEURO: alert, moves all extremities, follows commands          Note Disclaimer: At [facility], we believe that sharing information builds trust and better relationships. You are receiving this note because you recently visited [facility]. It is possible you will see health information before a provider has talked with you about it. This kind of information can be easy to misunderstand. To help you fully understand what it means for your health, we urge you to discuss this note with your provider.

## 2025-06-24 NOTE — PLAN OF CARE
Goal Outcome Evaluation:  Plan of Care Reviewed With: patient        Progress: improving  Outcome Evaluation: Pt. admitted today for intractable headache. Awaiting MRI of head, neurology is following. At this time VSS, pt. A&Ox4, and all questions answered.       Problem: Adult Inpatient Plan of Care  Goal: Plan of Care Review  Outcome: Progressing  Flowsheets (Taken 6/24/2025 1821)  Progress: improving  Outcome Evaluation: Pt. admitted today for intractable headache. Awaiting MRI of head, neurology is following. At this time VSS, pt. A&Ox4, and all questions answered.  Plan of Care Reviewed With: patient  Goal: Patient-Specific Goal (Individualized)  Outcome: Progressing  Goal: Absence of Hospital-Acquired Illness or Injury  Outcome: Progressing  Intervention: Identify and Manage Fall Risk  Recent Flowsheet Documentation  Taken 6/24/2025 1806 by Jose Knox RN  Safety Promotion/Fall Prevention:   activity supervised   fall prevention program maintained   lighting adjusted   safety round/check completed  Intervention: Prevent Skin Injury  Recent Flowsheet Documentation  Taken 6/24/2025 1806 by Jose Knox RN  Body Position: position changed independently  Intervention: Prevent and Manage VTE (Venous Thromboembolism) Risk  Recent Flowsheet Documentation  Taken 6/24/2025 1806 by Jose Knox RN  VTE Prevention/Management:   SCDs (sequential compression devices) off   patient refused intervention  Intervention: Prevent Infection  Recent Flowsheet Documentation  Taken 6/24/2025 1806 by Jose Knox RN  Infection Prevention:   hand hygiene promoted   single patient room provided   rest/sleep promoted  Goal: Optimal Comfort and Wellbeing  Outcome: Progressing  Intervention: Monitor Pain and Promote Comfort  Recent Flowsheet Documentation  Taken 6/24/2025 1806 by Jose Knox RN  Pain Management Interventions: no interventions per patient request  Intervention: Provide Person-Centered Care  Recent Flowsheet  Documentation  Taken 6/24/2025 1806 by Jose Knox RN  Trust Relationship/Rapport:   care explained   choices provided   questions answered   questions encouraged  Goal: Readiness for Transition of Care  Outcome: Progressing  Intervention: Mutually Develop Transition Plan  Recent Flowsheet Documentation  Taken 6/24/2025 1719 by Jose Knox, RN  Transportation Anticipated: family or friend will provide  Patient/Family Anticipated Services at Transition: none  Patient/Family Anticipates Transition to: home with family  Taken 6/24/2025 1718 by Jose Knox, RN  Equipment Currently Used at Home: none     Problem: Comorbidity Management  Goal: Maintenance of Asthma Control  Outcome: Progressing  Intervention: Maintain Asthma Symptom Control  Recent Flowsheet Documentation  Taken 6/24/2025 1806 by Jose Knox RN  Medication Review/Management: medications reviewed  Goal: Blood Pressure in Desired Range  Outcome: Progressing  Intervention: Maintain Blood Pressure Management  Recent Flowsheet Documentation  Taken 6/24/2025 1806 by Jose Knox, RN  Medication Review/Management: medications reviewed

## 2025-06-25 ENCOUNTER — APPOINTMENT (OUTPATIENT)
Dept: MRI IMAGING | Facility: HOSPITAL | Age: 65
End: 2025-06-25
Payer: MEDICARE

## 2025-06-25 ENCOUNTER — APPOINTMENT (OUTPATIENT)
Dept: GENERAL RADIOLOGY | Facility: HOSPITAL | Age: 65
End: 2025-06-25
Payer: MEDICARE

## 2025-06-25 VITALS
RESPIRATION RATE: 17 BRPM | SYSTOLIC BLOOD PRESSURE: 115 MMHG | TEMPERATURE: 98.4 F | OXYGEN SATURATION: 91 % | HEIGHT: 60 IN | HEART RATE: 74 BPM | WEIGHT: 175 LBS | DIASTOLIC BLOOD PRESSURE: 46 MMHG | BODY MASS INDEX: 34.36 KG/M2

## 2025-06-25 PROBLEM — E04.1 THYROID NODULE: Status: ACTIVE | Noted: 2025-06-25

## 2025-06-25 PROBLEM — I67.1 ANEURYSM, OPHTHALMIC ARTERY: Status: ACTIVE | Noted: 2025-06-25

## 2025-06-25 PROBLEM — R29.818 SUSPECTED SLEEP APNEA: Status: ACTIVE | Noted: 2025-06-25

## 2025-06-25 LAB
APPEARANCE CSF: CLEAR
APPEARANCE CSF: CLEAR
COLOR CSF: COLORLESS
COLOR CSF: COLORLESS
GLUCOSE CSF-MCNC: 58 MG/DL (ref 40–70)
HOLD SPECIMEN: NORMAL
METHOD: ABNORMAL
METHOD: ABNORMAL
NUC CELL # CSF MANUAL: 1 /MM3 (ref 0–5)
NUC CELL # CSF MANUAL: 1 /MM3 (ref 0–5)
PROT CSF-MCNC: 25.9 MG/DL (ref 15–45)
RBC # CSF MANUAL: 3 /MM3 (ref 0–0)
RBC # CSF MANUAL: 31 /MM3 (ref 0–0)
TUBE # CSF: 1
TUBE # CSF: 3

## 2025-06-25 PROCEDURE — 25010000002 LIDOCAINE 1 % SOLUTION: Performed by: EMERGENCY MEDICINE

## 2025-06-25 PROCEDURE — 76014 MR SFTY IMPLT&/FB ASMT STF 1: CPT

## 2025-06-25 PROCEDURE — 96376 TX/PRO/DX INJ SAME DRUG ADON: CPT

## 2025-06-25 PROCEDURE — A9577 INJ MULTIHANCE: HCPCS | Performed by: EMERGENCY MEDICINE

## 2025-06-25 PROCEDURE — G0378 HOSPITAL OBSERVATION PER HR: HCPCS

## 2025-06-25 PROCEDURE — 25010000002 KETOROLAC TROMETHAMINE PER 15 MG: Performed by: PHYSICIAN ASSISTANT

## 2025-06-25 PROCEDURE — 25010000002 DIAZEPAM PER 5 MG: Performed by: NURSE PRACTITIONER

## 2025-06-25 PROCEDURE — 25010000002 PROCHLORPERAZINE 10 MG/2ML SOLUTION: Performed by: NURSE PRACTITIONER

## 2025-06-25 PROCEDURE — 96361 HYDRATE IV INFUSION ADD-ON: CPT

## 2025-06-25 PROCEDURE — 25010000002 DIAZEPAM PER 5 MG: Performed by: PHYSICIAN ASSISTANT

## 2025-06-25 PROCEDURE — 89050 BODY FLUID CELL COUNT: CPT | Performed by: PHYSICIAN ASSISTANT

## 2025-06-25 PROCEDURE — 99204 OFFICE O/P NEW MOD 45 MIN: CPT | Performed by: NURSE PRACTITIONER

## 2025-06-25 PROCEDURE — 25010000002 MAGNESIUM SULFATE 2 GM/50ML SOLUTION: Performed by: PHYSICIAN ASSISTANT

## 2025-06-25 PROCEDURE — 96375 TX/PRO/DX INJ NEW DRUG ADDON: CPT

## 2025-06-25 PROCEDURE — 25010000002 DIPHENHYDRAMINE PER 50 MG: Performed by: NURSE PRACTITIONER

## 2025-06-25 PROCEDURE — 82945 GLUCOSE OTHER FLUID: CPT | Performed by: PHYSICIAN ASSISTANT

## 2025-06-25 PROCEDURE — 25510000002 GADOBENATE DIMEGLUMINE 529 MG/ML SOLUTION: Performed by: EMERGENCY MEDICINE

## 2025-06-25 PROCEDURE — 84157 ASSAY OF PROTEIN OTHER: CPT | Performed by: PHYSICIAN ASSISTANT

## 2025-06-25 PROCEDURE — 70553 MRI BRAIN STEM W/O & W/DYE: CPT

## 2025-06-25 PROCEDURE — 25010000002 MORPHINE PER 10 MG: Performed by: NURSE PRACTITIONER

## 2025-06-25 PROCEDURE — 99215 OFFICE O/P EST HI 40 MIN: CPT | Performed by: NURSE PRACTITIONER

## 2025-06-25 RX ORDER — MORPHINE SULFATE 2 MG/ML
4 INJECTION, SOLUTION INTRAMUSCULAR; INTRAVENOUS ONCE
Status: COMPLETED | OUTPATIENT
Start: 2025-06-25 | End: 2025-06-25

## 2025-06-25 RX ORDER — DIAZEPAM 10 MG/2ML
5 INJECTION, SOLUTION INTRAMUSCULAR; INTRAVENOUS ONCE
Status: COMPLETED | OUTPATIENT
Start: 2025-06-25 | End: 2025-06-25

## 2025-06-25 RX ORDER — LIDOCAINE HYDROCHLORIDE 10 MG/ML
10 INJECTION, SOLUTION INFILTRATION; PERINEURAL ONCE
Status: COMPLETED | OUTPATIENT
Start: 2025-06-25 | End: 2025-06-25

## 2025-06-25 RX ORDER — KETOROLAC TROMETHAMINE 15 MG/ML
15 INJECTION, SOLUTION INTRAMUSCULAR; INTRAVENOUS ONCE
Status: COMPLETED | OUTPATIENT
Start: 2025-06-25 | End: 2025-06-25

## 2025-06-25 RX ORDER — MAGNESIUM SULFATE HEPTAHYDRATE 40 MG/ML
2 INJECTION, SOLUTION INTRAVENOUS ONCE
Status: COMPLETED | OUTPATIENT
Start: 2025-06-25 | End: 2025-06-25

## 2025-06-25 RX ORDER — RIBOFLAVIN (VITAMIN B2) 400 MG
400 TABLET ORAL DAILY
Qty: 90 TABLET | Refills: 0 | Status: SHIPPED | OUTPATIENT
Start: 2025-06-25

## 2025-06-25 RX ORDER — DIAZEPAM 10 MG/2ML
5 INJECTION, SOLUTION INTRAMUSCULAR; INTRAVENOUS ONCE
Status: DISCONTINUED | OUTPATIENT
Start: 2025-06-26 | End: 2025-06-25

## 2025-06-25 RX ORDER — MAGNESIUM OXIDE 400 MG/1
400 TABLET ORAL DAILY
Qty: 90 TABLET | Refills: 0 | Status: SHIPPED | OUTPATIENT
Start: 2025-06-25

## 2025-06-25 RX ORDER — RIBOFLAVIN (VITAMIN B2) 100 MG
400 TABLET ORAL DAILY
Status: DISCONTINUED | OUTPATIENT
Start: 2025-06-25 | End: 2025-06-25 | Stop reason: HOSPADM

## 2025-06-25 RX ORDER — PROCHLORPERAZINE EDISYLATE 5 MG/ML
10 INJECTION INTRAMUSCULAR; INTRAVENOUS ONCE
Status: DISCONTINUED | OUTPATIENT
Start: 2025-06-25 | End: 2025-06-25 | Stop reason: HOSPADM

## 2025-06-25 RX ORDER — DIPHENHYDRAMINE HYDROCHLORIDE 50 MG/ML
25 INJECTION, SOLUTION INTRAMUSCULAR; INTRAVENOUS ONCE
Status: DISCONTINUED | OUTPATIENT
Start: 2025-06-25 | End: 2025-06-25 | Stop reason: HOSPADM

## 2025-06-25 RX ADMIN — KETOROLAC TROMETHAMINE 15 MG: 15 INJECTION INTRAMUSCULAR at 12:03

## 2025-06-25 RX ADMIN — DIPHENHYDRAMINE HYDROCHLORIDE 25 MG: 50 INJECTION, SOLUTION INTRAMUSCULAR; INTRAVENOUS at 12:03

## 2025-06-25 RX ADMIN — GADOBENATE DIMEGLUMINE 15 ML: 529 INJECTION, SOLUTION INTRAVENOUS at 00:49

## 2025-06-25 RX ADMIN — PROCHLORPERAZINE EDISYLATE 10 MG: 5 INJECTION INTRAMUSCULAR; INTRAVENOUS at 12:03

## 2025-06-25 RX ADMIN — MORPHINE SULFATE 4 MG: 2 INJECTION, SOLUTION INTRAMUSCULAR; INTRAVENOUS at 04:50

## 2025-06-25 RX ADMIN — PANTOPRAZOLE SODIUM 40 MG: 40 TABLET, DELAYED RELEASE ORAL at 05:00

## 2025-06-25 RX ADMIN — DIAZEPAM 5 MG: 5 INJECTION INTRAMUSCULAR; INTRAVENOUS at 00:12

## 2025-06-25 RX ADMIN — MAGNESIUM OXIDE TAB 400 MG (240 MG ELEMENTAL MG) 400 MG: 400 (240 MG) TAB at 12:04

## 2025-06-25 RX ADMIN — Medication 400 MG: at 12:04

## 2025-06-25 RX ADMIN — Medication 10 ML: at 08:39

## 2025-06-25 RX ADMIN — LIDOCAINE HYDROCHLORIDE 4 ML: 10 INJECTION, SOLUTION INFILTRATION; PERINEURAL at 01:39

## 2025-06-25 RX ADMIN — SODIUM CHLORIDE 1000 MG: 9 INJECTION, SOLUTION INTRAVENOUS at 17:04

## 2025-06-25 RX ADMIN — MAGNESIUM SULFATE HEPTAHYDRATE 2 G: 40 INJECTION, SOLUTION INTRAVENOUS at 15:38

## 2025-06-25 RX ADMIN — DIAZEPAM 5 MG: 5 INJECTION, SOLUTION INTRAMUSCULAR; INTRAVENOUS at 13:10

## 2025-06-25 NOTE — PLAN OF CARE
Problem: Adult Inpatient Plan of Care  Goal: Plan of Care Review  Outcome: Progressing  Flowsheets (Taken 6/25/2025 1014)  Progress: improving  Outcome Evaluation: Pt staets pain is tolerable. Pt to have IR today, if neg will be DC home.  Plan of Care Reviewed With: patient  Goal: Patient-Specific Goal (Individualized)  Outcome: Progressing  Goal: Absence of Hospital-Acquired Illness or Injury  Outcome: Progressing  Intervention: Identify and Manage Fall Risk  Recent Flowsheet Documentation  Taken 6/25/2025 0948 by Dee Dee Nash RN  Safety Promotion/Fall Prevention:   nonskid shoes/slippers when out of bed   room organization consistent   safety round/check completed  Taken 6/25/2025 0800 by Dee Dee Nash RN  Safety Promotion/Fall Prevention:   nonskid shoes/slippers when out of bed   room organization consistent   safety round/check completed   fall prevention program maintained  Intervention: Prevent Skin Injury  Recent Flowsheet Documentation  Taken 6/25/2025 0948 by Dee Dee Nsah RN  Body Position: position changed independently  Taken 6/25/2025 0800 by Dee Dee Nash RN  Body Position: position changed independently  Intervention: Prevent Infection  Recent Flowsheet Documentation  Taken 6/25/2025 0948 by Dee Dee Nash RN  Infection Prevention:   rest/sleep promoted   single patient room provided   hand hygiene promoted  Taken 6/25/2025 0800 by Dee Dee Nash RN  Infection Prevention:   rest/sleep promoted   single patient room provided   hand hygiene promoted  Goal: Optimal Comfort and Wellbeing  Outcome: Progressing  Intervention: Provide Person-Centered Care  Recent Flowsheet Documentation  Taken 6/25/2025 0800 by Dee Dee Nash RN  Trust Relationship/Rapport:   care explained   choices provided   emotional support provided   empathic listening provided   questions answered   questions encouraged   reassurance provided   thoughts/feelings acknowledged  Goal: Readiness for  Transition of Care  Outcome: Progressing     Problem: Comorbidity Management  Goal: Maintenance of Asthma Control  Outcome: Progressing  Goal: Blood Pressure in Desired Range  Outcome: Progressing     Problem: Fall Injury Risk  Goal: Absence of Fall and Fall-Related Injury  Outcome: Progressing  Intervention: Promote Injury-Free Environment  Recent Flowsheet Documentation  Taken 6/25/2025 0948 by Dee Dee Nash, RN  Safety Promotion/Fall Prevention:   nonskid shoes/slippers when out of bed   room organization consistent   safety round/check completed  Taken 6/25/2025 0800 by Dee Dee Nash, RN  Safety Promotion/Fall Prevention:   nonskid shoes/slippers when out of bed   room organization consistent   safety round/check completed   fall prevention program maintained     Problem: Pain Acute  Goal: Optimal Pain Control and Function  Outcome: Progressing   Goal Outcome Evaluation:  Plan of Care Reviewed With: patient        Progress: improving  Outcome Evaluation: Pt staets pain is tolerable. Pt to have IR today, if neg will be DC home.

## 2025-06-25 NOTE — CONSULTS
[facility] NEUROSURGERY CONSULT NOTE    Patient name: Leah Nova  Referring Provider: MD Annika  Reason for Consultation: Cerebral aneurysm    Patient Care Team:  Rafael Gomez as PCP - General (Internal Medicine)    Chief complaint: Headache and blurred vision    Subjective .     History of present illness:    Patient is a 65 y.o. female who began having right sided headache with blurred vision about a week ago with some associated nausea.  She has never experienced any headaches like this before and tried over-the-counter medication without relief.  She has no history of aneurysms or family history of aneurysms.  She is a current everyday smoker.  She reports blood pressures are typically controlled.  She denies any injury.  She denies any associated strokelike symptoms, denies loss of strength/sensation.    Review of Systems  Review of Systems   Constitutional:  Positive for activity change.   Musculoskeletal:  Negative for gait problem.   Neurological:  Positive for headaches. Negative for dizziness, weakness and numbness.       History  PAST MEDICAL HISTORY  Past Medical History:   Diagnosis Date    Cervical cancer     years ago    Chronic pain     WITH NECK BACK AND LEGS    COPD (chronic obstructive pulmonary disease)     Depression     GERD (gastroesophageal reflux disease)     History of panic attacks     Hyperlipidemia     Injury of back     Skin cancer     BCC REMOVED FROM FACE ABOVE LIP. SQUAMOUS CELL REMOVED RIGHT LEG    Umbilical hernia        PAST SURGICAL HISTORY  Past Surgical History:   Procedure Laterality Date    ANTERIOR CERVICAL DISCECTOMY W/ FUSION      X2    APPENDECTOMY      BLADDER SUSPENSION      COLONOSCOPY N/A 05/06/2024    Procedure: COLONOSCOPY;  Surgeon: Han Lawler MD;  Location: [facility] [place] MAIN OR;  Service: Gastroenterology;  Laterality: N/A;  POLYPS, INTERNAL HEMORRHOIDS    ENDOSCOPY N/A 05/06/2024    Procedure: ESOPHAGOGASTRODUODENOSCOPY;  Surgeon: Han Lawler MD;   Location[place] [facility] [place];  Service: Gastroenterology;  Laterality: N/A;  GASTRITIS    HYSTERECTOMY      LUMBAR DISCECTOMY FUSION INSTRUMENTATION      SPINAL CORD STIMULATOR IMPLANT      HAS SINCE BEEN REMOVED    TONSILLECTOMY      TUBAL ABDOMINAL LIGATION      UMBILICAL HERNIA REPAIR N/A 09/06/2024    Procedure: UMBILICAL HERNIA REPAIR, with mesh placement;  Surgeon: Bradley Purcell MD;  Location: [facility] MAIN OR;  Service: General;  Laterality: N/A;       FAMILY HISTORY  Family History   Problem Relation Age of Onset    Malig Hyperthermia Neg Hx        SOCIAL HISTORY  Social History     Tobacco Use    Smoking status: Every Day     Current packs/day: 0.50     Average packs/day: 0.5 packs/day for 0.2 years (0.1 ttl pk-yrs)     Types: Cigarettes     Start date: 05/2025     Passive exposure: Current (former smoker quit in April, had a life event that made her start smoking again.)    Smokeless tobacco: Never    Tobacco comments:     39 YEAR 1/2 PPD SMOKER   Vaping Use    Vaping status: Never Used   Substance Use Topics    Alcohol use: No    Drug use: No       Allergies:  Prozac [fluoxetine]    MEDICATIONS:  Medications Prior to Admission   Medication Sig Dispense Refill Last Dose/Taking    diphenhydrAMINE-acetaminophen (TYLENOL PM)  MG tablet per tablet Take 2 tablets by mouth At Night As Needed for Sleep.   6/23/2025 Bedtime    Ibsrela 50 MG tablet TAKE 1 TABLET BY MOUTH TWICE A DAY IMMEDIATELY BEFORE MEALS   6/23/2025 Evening    omeprazole (priLOSEC) 40 MG capsule Take 1 capsule by mouth 2 (Two) Times a Day Before Meals. (Patient taking differently: Take 1 capsule by mouth 2 (Two) Times a Day.) 180 capsule 3 6/23/2025 Morning    rosuvastatin (CRESTOR) 10 MG tablet Take 1 tablet by mouth Every Night.   6/23/2025 Bedtime    albuterol (PROVENTIL HFA;VENTOLIN HFA) 108 (90 Base) MCG/ACT inhaler Inhale 2 puffs Every 4 (Four) Hours As Needed.   Unknown    albuterol (PROVENTIL) (2.5 MG/3ML) 0.083% nebulizer solution  Take 2.5 mg by nebulization Every 4 (Four) Hours As Needed for Wheezing or Shortness of Air.   Unknown    Mirabegron ER (Myrbetriq) 50 MG tablet sustained-release 24 hour 24 hr tablet Take 50 mg by mouth Daily. WILL START OF SCRIPT FOR TROSPLUM RUNS OUT   Unknown         Current Facility-Administered Medications:     albuterol (PROVENTIL) nebulizer solution 0.083% 2.5 mg/3mL, 2.5 mg, Nebulization, Q6H PRN, Blevens, Bria C, APRN    Calcium Replacement - Follow Nurse / BPA Driven Protocol, , Not Applicable, PRN, Blevens, Bria C, APRN    [START ON 6/26/2025] diazePAM (VALIUM) injection 5 mg, 5 mg, Intravenous, Once, Duane Matta III, PA    diphenhydrAMINE (BENADRYL) injection 25 mg, 25 mg, Intravenous, Q6H PRN, Blevens, Bria C, APRN    Magnesium Standard Dose Replacement - Follow Nurse / BPA Driven Protocol, , Not Applicable, PRN, Blevens, Bria C, APRN    nitroglycerin (NITROSTAT) SL tablet 0.4 mg, 0.4 mg, Sublingual, Q5 Min PRN, Blevens, Bria C, APRN    pantoprazole (PROTONIX) EC tablet 40 mg, 40 mg, Oral, Q AM, Blevens, Bria C, APRN, 40 mg at 06/25/25 0500    Phosphorus Replacement - Follow Nurse / BPA Driven Protocol, , Not Applicable, PRN, Blevens, Bria C, APRN    Potassium Replacement - Follow Nurse / BPA Driven Protocol, , Not Applicable, PRN, Blevens, Bria C, APRN    prochlorperazine (COMPAZINE) injection 10 mg, 10 mg, Intravenous, Q6H PRN, Blevens, Bria C, APRN    rosuvastatin (CRESTOR) tablet 10 mg, 10 mg, Oral, Nightly, Blevens, Bria C, APRN, 10 mg at 06/24/25 2106    sodium chloride 0.9 % flush 10 mL, 10 mL, Intravenous, Q12H, Blevens, Bria C, APRN, 10 mL at 06/25/25 0839    sodium chloride 0.9 % flush 10 mL, 10 mL, Intravenous, PRN, Blevens, Bria C, APRN    sodium chloride 0.9 % infusion 40 mL, 40 mL, Intravenous, PRN, Blevens, Bria C, APRN      Objective     Results Review:  LABS:  Results from last 7 days   Lab Units 06/24/25  1109   WBC 10*3/mm3 8.27   HEMOGLOBIN g/dL 14.0   HEMATOCRIT % 43.2    PLATELETS 10*3/mm3 257     Results from last 7 days   Lab Units 06/24/25  1109   SODIUM mmol/L 143   POTASSIUM mmol/L 4.4   CHLORIDE mmol/L 105   CO2 mmol/L 27.4   BUN mg/dL 4.0*   CREATININE mg/dL 0.79   CALCIUM mg/dL 9.4   BILIRUBIN mg/dL 0.3   ALK PHOS U/L 104   ALT (SGPT) U/L 11   AST (SGOT) U/L 16   GLUCOSE mg/dL 94         DIAGNOSTICS:  CT Angiogram Neck  Result Date: 6/25/2025  1.  There is no evidence of acute infarction or of intracranial hemorrhage. Further evaluation could be performed with MRI examination of the brain. 2.  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 The right A1 segment is hypoplastic. There is prominence of the anterior communicating artery superiorly. This may be due to a small infundibulum although a small (1 mm) aneurysm cannot be excluded. 3.  There is nodular prominence of the inferior aspect of the left lobe of the thyroid gland, obscured by beam hardening artifact from the patient's shoulders. A thyroid ultrasound is recommended. The above information was called and discussed with Dr. Quinones.  Radiation dose reduction techniques were utilized, including automated exposure control and exposure modulation based on body size.    This report was finalized on 6/25/2025 7:09 AM by Dr. Suhas Manuel M.D on Workstation: [id number]      MRI Brain With & Without Contrast  Result Date: 6/25/2025  1. No acute intracranial abnormality.  No abnormal enhancement.   This report was finalized on 6/25/2025 1:52 AM by Dr. Carmencita Oro M.D on Workstation: [id number]          Results Review:   I reviewed the patient's new clinical results.  I personally viewed and interpreted the patient's chart    Vital Signs   Temp:  [97.5 °F (36.4 °C)-98.2 °F (36.8 °C)] 97.9 °F (36.6 °C)  Heart Rate:  [68-84] 70  Resp:  [18-22] 18  BP: (109-143)/(66-99) 109/66    Physical exam  Awake, alert, oriented x3  Pupils equal round reactive to light  Extraocular muscles intact  Face symmetric  Speech  is fluent and clear  No pronator drift  Motor exam  Bilateral deltoids 5/5, bilateral biceps 5/5, bilateral triceps 5/5, bilateral wrist extension 5/5 bilateral hand  5/5  Bilateral hip flexion 5/5, bilateral knee extension 5/5, bilateral DF/PF 5/5  gait deferred  Able to detect  light touch in all 4 extremities      Assessment & Plan       Intractable headache      Problem List Items Addressed This Visit       * (Principal) Intractable headache - Primary    Relevant Medications    morphine injection 4 mg (Completed)    ketorolac (TORADOL) injection 30 mg (Completed)    morphine injection 4 mg (Completed)     Other Visit Diagnoses         Double vision                 COMORBID CONDITIONS:  No Comorbidities    65-year-old female with a week and a half right sided headache and blurred vision that has progressively worsened 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    PLAN:     CTA head and neck was indicative of a left-sided 3 mm ophthalmic aneurysm.  She still has complaints of headache.  We will check an LP t for xanthochromia.  If xanthochromia is absent, she is okay to go home and follow-up with us in 3 months with repeat CTA head and neck.  If LP is positive, she will likely need to undergo urgent angiogram.  We discussed smoking cessation.  Keep n.p.o.    I discussed the patient's findings and my recommendations with patient, family, nursing staff, and primary care team    I spent 45 minutes caring for Leah Nova on this date of service. This time includes time spent by me in the following activities: preparing for the visit, reviewing tests, obtaining and/or reviewing a separately obtained history, performing a medically appropriate examination and/or evaluation, counseling and educating the patient/family/caregiver, ordering medications, tests, or procedures, referring and communicating with other health care professionals, documenting information in the medical record, independently  "interpreting results and communicating that information with the patient/family/caregiver, and care coordination         Rita Lainez, APRN  06/25/25  10:03 EDT    \"Dictated utilizing Dragon dictation\".      "

## 2025-06-25 NOTE — PROGRESS NOTES
Patient Care Team:  Rafael Gomez as PCP - General (Internal Medicine)     YENNY VALVERDE Progress Note    I supervised care provided by the midlevel provider. We have discussed this patient's history, physical exam, and treatment plan. I have reviewed the midlevel provider's note and I agree with the midlevel provider's findings and plan of care.   SHARED VISIT: This visit was performed by BOTH a physician and an APC. The substantive portion of the medical decision making was performed by this attesting physician who made or approved the management plan and takes responsibility for patient management.   I have personally had a face to face encounter with the patient.   My personal findings are documented below:    Subjective:  No acute events but continues complain of headache    Physical Exam:  General: No acute distress.  HENT: NCAT, PERRL, Nares patent.  Eyes: no scleral icterus.  Neck: trachea midline, no ROM limitations.  CV: regular rhythm, regular rate.  Respiratory: normal effort, CTAB.  Abdomen: soft, nondistended, NTTP, no rebound tenderness, no guarding or rigidity.  Musculoskeletal: no deformity.  Neuro: Alert and oriented, no facial droop, speech clear, no dysarthria or aphasia, moves all extremities well, sensation intact light touch all extremities, no focal deficits  Skin: warm, dry.    Assessment and Plan:  Patient admitted for headache, CT angiogram showed aneurysm of left ophthalmic artery, neurosurgery evaluated and plans for IR LP to evaluate for xanthochromia with possible urgent angiogram if positive.  Treating headache, neurology consulted, monitoring.

## 2025-06-25 NOTE — PLAN OF CARE
Problem: Adult Inpatient Plan of Care  Goal: Plan of Care Review  6/25/2025 1734 by Dee Dee Nash RN  Outcome: Met  6/25/2025 1014 by Dee Dee Nash RN  Outcome: Progressing  Flowsheets (Taken 6/25/2025 1014)  Progress: improving  Outcome Evaluation: Pt staets pain is tolerable. Pt to have IR today, if neg will be DC home.  Plan of Care Reviewed With: patient  Goal: Patient-Specific Goal (Individualized)  6/25/2025 1734 by Dee Dee Nash RN  Outcome: Met  6/25/2025 1014 by Dee Dee Nash RN  Outcome: Progressing  Goal: Absence of Hospital-Acquired Illness or Injury  6/25/2025 1734 by Dee Dee Nash RN  Outcome: Met  6/25/2025 1014 by Dee Dee Nash RN  Outcome: Progressing  Intervention: Identify and Manage Fall Risk  Recent Flowsheet Documentation  Taken 6/25/2025 1436 by Dee Dee Nash RN  Safety Promotion/Fall Prevention:   nonskid shoes/slippers when out of bed   room organization consistent   safety round/check completed  Taken 6/25/2025 1200 by Dee Dee Nash RN  Safety Promotion/Fall Prevention:   nonskid shoes/slippers when out of bed   room organization consistent   safety round/check completed  Taken 6/25/2025 1000 by Dee Dee Nash RN  Safety Promotion/Fall Prevention:   nonskid shoes/slippers when out of bed   room organization consistent   safety round/check completed  Taken 6/25/2025 0948 by Dee Dee Nash RN  Safety Promotion/Fall Prevention:   nonskid shoes/slippers when out of bed   room organization consistent   safety round/check completed  Taken 6/25/2025 0800 by Dee Dee Nash RN  Safety Promotion/Fall Prevention:   nonskid shoes/slippers when out of bed   room organization consistent   safety round/check completed   fall prevention program maintained  Intervention: Prevent Skin Injury  Recent Flowsheet Documentation  Taken 6/25/2025 1436 by Dee Dee Nash RN  Body Position: position changed independently  Taken 6/25/2025 1200 by Charity  SHELL Funez  Body Position: position changed independently  Taken 6/25/2025 1000 by Dee Dee Nash RN  Body Position: position changed independently  Taken 6/25/2025 0948 by Dee Dee Nash RN  Body Position: position changed independently  Taken 6/25/2025 0800 by Dee Dee Nash RN  Body Position: position changed independently  Intervention: Prevent Infection  Recent Flowsheet Documentation  Taken 6/25/2025 1436 by Dee Dee Nash RN  Infection Prevention:   rest/sleep promoted   single patient room provided   hand hygiene promoted  Taken 6/25/2025 1200 by Dee Dee Nash RN  Infection Prevention:   rest/sleep promoted   single patient room provided   hand hygiene promoted  Taken 6/25/2025 1000 by Dee Dee Nash RN  Infection Prevention:   rest/sleep promoted   single patient room provided   hand hygiene promoted  Taken 6/25/2025 0948 by Dee Dee Nash RN  Infection Prevention:   rest/sleep promoted   single patient room provided   hand hygiene promoted  Taken 6/25/2025 0800 by Dee Dee Nash RN  Infection Prevention:   rest/sleep promoted   single patient room provided   hand hygiene promoted  Goal: Optimal Comfort and Wellbeing  6/25/2025 1734 by Dee Dee Nash RN  Outcome: Met  6/25/2025 1014 by Dee Dee Nash RN  Outcome: Progressing  Intervention: Monitor Pain and Promote Comfort  Recent Flowsheet Documentation  Taken 6/25/2025 1436 by Dee Dee Nash RN  Pain Management Interventions: quiet environment facilitated  Intervention: Provide Person-Centered Care  Recent Flowsheet Documentation  Taken 6/25/2025 0800 by Dee Dee Nash RN  Trust Relationship/Rapport:   care explained   choices provided   emotional support provided   empathic listening provided   questions answered   questions encouraged   reassurance provided   thoughts/feelings acknowledged  Goal: Readiness for Transition of Care  6/25/2025 1734 by Dee Dee Nash RN  Outcome: Met  6/25/2025 1014 by  Dee Dee Nash RN  Outcome: Progressing     Problem: Comorbidity Management  Goal: Maintenance of Asthma Control  6/25/2025 1734 by Dee Dee Nash RN  Outcome: Met  6/25/2025 1014 by Dee Dee Nash RN  Outcome: Progressing  Goal: Blood Pressure in Desired Range  6/25/2025 1734 by Dee Dee Nash RN  Outcome: Met  6/25/2025 1014 by Dee Dee Nash RN  Outcome: Progressing     Problem: Fall Injury Risk  Goal: Absence of Fall and Fall-Related Injury  6/25/2025 1734 by eDe Dee Nash RN  Outcome: Met  6/25/2025 1014 by Dee Dee Nash RN  Outcome: Progressing  Intervention: Promote Injury-Free Environment  Recent Flowsheet Documentation  Taken 6/25/2025 1436 by Dee Dee Nash RN  Safety Promotion/Fall Prevention:   nonskid shoes/slippers when out of bed   room organization consistent   safety round/check completed  Taken 6/25/2025 1200 by Dee Dee Nash RN  Safety Promotion/Fall Prevention:   nonskid shoes/slippers when out of bed   room organization consistent   safety round/check completed  Taken 6/25/2025 1000 by Dee Dee Nash RN  Safety Promotion/Fall Prevention:   nonskid shoes/slippers when out of bed   room organization consistent   safety round/check completed  Taken 6/25/2025 0948 by Dee Dee Nash RN  Safety Promotion/Fall Prevention:   nonskid shoes/slippers when out of bed   room organization consistent   safety round/check completed  Taken 6/25/2025 0800 by Dee Dee Nash RN  Safety Promotion/Fall Prevention:   nonskid shoes/slippers when out of bed   room organization consistent   safety round/check completed   fall prevention program maintained     Problem: Pain Acute  Goal: Optimal Pain Control and Function  6/25/2025 1734 by Dee Dee Nash RN  Outcome: Met  6/25/2025 1014 by Dee Dee Nash RN  Outcome: Progressing  Intervention: Develop Pain Management Plan  Recent Flowsheet Documentation  Taken 6/25/2025 1436 by Dee Dee Nash RN  Pain  Management Interventions: quiet environment facilitated   Goal Outcome Evaluation:  Plan of Care Reviewed With: patient        Progress: improving  Outcome Evaluation: Pt staets pain is tolerable. Pt to have IR today, if neg will be DC home.

## 2025-06-25 NOTE — DISCHARGE SUMMARY
ED OBSERVATION PROGRESS/DISCHARGE SUMMARY    Date of Admission: 6/24/2025   LOS: 0 days   PCP: Rafael Gomez    Final Diagnosis: Acute headache with visual disturbance, suspected RG, left ophthalmic aneurysm, thyroid nodule      Subjective     Hospital Outcome:   Leah Nova is a 65 y.o. female who was admitted to the ED observation unit with intractable right-sided headache x 6 days and associated nausea, blurry and double vision, photophobia.  Labs are unremarkable.  CTA head and neck shows no evidence of acute infarct or intracranial hemorrhage.  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  Thyroid nodule noted as well, outpatient thyroid ultrasound recommended.  Migraine cocktail and IV Inapsine were given with minimal relief.  Migraine cocktail was repeated with some relief noted.    6/25/2025:   MRI of the brain with and without contrast reported no acute intracranial abnormality.  IV Valium was given prior to MRI the provided mild temporary relief of headache.  IV morphine was given for persistent complaint of unrelieved headache.  Supplemental oxygen was given for low oxygen saturation due to sedation from IV medications and in part from patient's many years of tobacco abuse.  Vital signs stable.  Afebrile.    8:47 AM.  Patient has been evaluated by neurosurgery.  Given the incidental aneurysm finding and the patient's headaches we are going to check an LP to ensure no xanthochromia.  If no xanthochromia she is to follow-up with neurosurgery in 3 months time for repeat CTA of the head and neck.    1:47 PM.  Patient has been evaluated by neurology.  He is cleared for discharge from their perspective.  They recommend starting mag oxide 400 mg daily, riboflavin 400 mg daily, outpatient sleep study referral, follow-up with ophthalmology on an outpatient basis.  Assistance from tobacco was distress.  Follow-up with neurosurgery for ophthalmic aneurysm.  In  regards to her thyroid nodule we will have her to follow-up with primary care.    3:22 PM.  No xanthochromia on LP, CSF clear in appearance.  Patient otherwise cleared by neurosurgery for follow-up in 3 months time.      Patient reports her headache is much better after the last migraine cocktail but family insists that the patient reports it is still lingering.  Will attempt mag sulfate 2 g IV, Depakote 1 g IV and reassess.    5:40 PM.  Patient feeling better, wants to go home, has been cleared for discharge by all parties.  Summary of the plan is as follows: Patient to follow up with neurosurgery in 3 months for repeat eval imaging.  Start patient on magnesium oxide 400 mg and riboflavin 4 mg daily.  To set up with a sleep study.  To set up with ophthalmology at discharge.  Primary to address and follow-up thyroid nodule..  She has follow-up with neurology on outpatient basis of headaches continue.    ROS:  General: no fevers, chills  Respiratory: no cough, dyspnea  Cardiovascular: no chest pain, palpitations  Abdomen: No abdominal pain, nausea, vomiting, or diarrhea  Neurologic: Headache, visual disturbance    Objective   Physical Exam:  I have reviewed the vital signs.  Temp:  [97.5 °F (36.4 °C)-98.2 °F (36.8 °C)] 97.9 °F (36.6 °C)  Heart Rate:  [68-84] 70  Resp:  [18-22] 18  BP: (109-143)/(66-99) 109/66  General Appearance:    Alert, cooperative, no distress  Head:    Normocephalic, atraumatic  Eyes:    Sclerae anicteric  Neck:   Supple, no mass  Lungs: Clear to auscultation bilaterally, respirations unlabored  Heart: Regular rate and rhythm, S1 and S2 normal, no murmur, rub or gallop  Abdomen:  Soft, nontender, bowel sounds active, nondistended  Extremities: No clubbing, cyanosis, or edema to lower extremities  Pulses:  2+ and symmetric in distal lower extremities  Skin: No rashes   Neurologic: Oriented x3, Normal strength to extremities    Results Review:    I have reviewed the labs, radiology results and  diagnostic studies.    Results from last 7 days   Lab Units 06/24/25  1109   WBC 10*3/mm3 8.27   HEMOGLOBIN g/dL 14.0   HEMATOCRIT % 43.2   PLATELETS 10*3/mm3 257     Results from last 7 days   Lab Units 06/24/25  1109   SODIUM mmol/L 143   POTASSIUM mmol/L 4.4   CHLORIDE mmol/L 105   CO2 mmol/L 27.4   BUN mg/dL 4.0*   CREATININE mg/dL 0.79   CALCIUM mg/dL 9.4   BILIRUBIN mg/dL 0.3   ALK PHOS U/L 104   ALT (SGPT) U/L 11   AST (SGOT) U/L 16   GLUCOSE mg/dL 94     Imaging Results (Last 24 Hours)       Procedure Component Value Units Date/Time    CT Angiogram Neck [[id number]] Collected: 06/24/25 1347     Updated: 06/25/25 0712    Narrative:      CT ANGIOGRAM NECK AND HEAD WITH CONTRAST     HISTORY: Severe headache, double vision.     COMPARISON: None.     FINDINGS: The brain and ventricles are symmetrical. There is no evidence  of hemorrhage, hydrocephalus or of abnormal extra-axial fluid. No focal  area of decreased attenuation to suggest acute infarction is identified.  Mild mucosal thickening involving the sphenoid sinus to the right as  well as a small volume of fluid involving the sphenoid sinus to the  right is noted.     A CT angiogram of the neck and head was then performed. Multiplanar as  well as three-dimensional reconstructions were generated. Emphysematous  changes involving the lung apices are appreciated bilaterally. The great  vessels are arranged in a classic configuration. There is 0% stenosis of  the internal carotid arteries using NASCET criteria. Mild vascular  calcification involving the carotid siphons is appreciated bilaterally.  A left ophthalmic segment aneurysm is appreciated measuring  approximately 3 mm in size. The right A1 segment is hypoplastic. There  is prominence of the anterior communicating artery superiorly. A 1 mm  aneurysm cannot be excluded.  The proximal aspects of the anterior and  middle cerebral arteries appear otherwise unremarkable. Both vertebral  arteries were opacified.  The right vertebral artery is slightly larger  than that of the left. The basilar artery and the proximal aspects of  the posterior cerebral arteries appear unremarkable. Beam hardening  artifact from the patient's shoulders limits evaluation of the thyroid,  but there is a nodular exophytic appearance of the inferior aspect of  the left lobe of the thyroid measuring approximately 12 x 12 mm in size.       Impression:      1.  There is no evidence of acute infarction or of intracranial  hemorrhage. Further evaluation could be performed with MRI examination  of the brain.   2.  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 The right A1  segment is hypoplastic. There is prominence of the anterior  communicating artery superiorly. This may be due to a small infundibulum  although a small (1 mm) aneurysm cannot be excluded.   3.  There is nodular prominence of the inferior aspect of the left lobe  of the thyroid gland, obscured by beam hardening artifact from the  patient's shoulders. A thyroid ultrasound is recommended. The above  information was called and discussed with Dr. Quinones.     Radiation dose reduction techniques were utilized, including automated  exposure control and exposure modulation based on body size.           This report was finalized on 6/25/2025 7:09 AM by Dr. Suhas Manuel M.D  on Workstation: [id number]       MRI Brain With & Without Contrast [[id number]] Collected: 06/25/25 0147     Updated: 06/25/25 0156    Narrative:      BRAIN MRI WITH AND WITHOUT CONTRAST     HISTORY: intractable headache, double vision; R51.9-Headache,  unspecified; H53.2-Diplopia     COMPARISON: June 24, 2025.     FINDINGS:  Multiplanar images of the head were obtained without and with  gadolinium. No areas of restricted diffusion are seen to suggest acute  infarct. 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 The  intracranial flow voids appear intact. Ventricles are normal in size.  There is no  midline shift or mass effect. There is some periventricular  and deep white matter microangiopathic change. No abnormality is seen on  susceptibility weighted imaging. No abnormal enhancement is seen  following contrast administration. Mucosal thickening is noted within  the paranasal sinuses, with mucus retention cysts noted within the right  maxillary and right sphenoid sinuses. There is also trace fluid within  the mastoid air cells, left greater than right.       Impression:      1. No acute intracranial abnormality.  No abnormal enhancement.         This report was finalized on 6/25/2025 1:52 AM by Dr. Carmencita Oor M.D on Workstation: [id number]       CT Angiogram Head [[id number]] Collected: 06/24/25 1318     Updated: 06/24/25 1328    Narrative:      The report for the CT angiogram of the head was included in the report  for the CT angiogram of the neck.        Radiation dose reduction techniques were utilized, including automated  exposure control and exposure modulation based on body size.        This report was finalized on 6/24/2025 1:24 PM by Dr. Suhas Manuel M.D  on Workstation: [id number]               I have reviewed the medications.     Discharge Medications        ASK your doctor about these medications        Instructions Start Date   albuterol sulfate  (90 Base) MCG/ACT inhaler  Commonly known as: PROVENTIL HFA;VENTOLIN HFA;PROAIR HFA   2 puffs, Inhalation, Every 4 Hours PRN      albuterol (2.5 MG/3ML) 0.083% nebulizer solution  Commonly known as: PROVENTIL   Take 2.5 mg by nebulization Every 4 (Four) Hours As Needed for Wheezing or Shortness of Air.      diphenhydrAMINE-acetaminophen  MG tablet per tablet  Commonly known as: TYLENOL PM   2 tablets, Nightly PRN      Ibsrela 50 MG tablet  Generic drug: Tenapanor HCl   TAKE 1 TABLET BY MOUTH TWICE A DAY IMMEDIATELY BEFORE MEALS      Myrbetriq 50 MG tablet sustained-release 24 hour 24 hr tablet  Generic drug: Mirabegron ER    50 mg, Oral, Daily, WILL START OF SCRIPT FOR TROSPLUM RUNS OUT      omeprazole 40 MG capsule  Commonly known as: priLOSEC   40 mg, Oral, 2 Times Daily Before Meals      rosuvastatin 10 MG tablet  Commonly known as: CRESTOR   10 mg, Nightly              ---------------------------------------------------------------------------------------------  Assessment & Plan   Assessment/Problem List    Intractable headache      Plan:  Intractable headache  Abnormal vision  Continues telemetry monitoring  Vital signs and neurochecks per nurse routine  CTA head neck pending official read, no acute infarct or hemorrhage  Consult neurology  MRI brain with and without contrast was negative  Neurology has cleared with plan above     Aneurysm  MRI of the brain with and without contrast was negative acute.  CTA of the head and neck reported 3 mm left ophthalmic segment aneurysm.  No xanthochromia and LP  Neurosurgery has cleared, follow-up 3 months     Thyroid nodules  Outpatient thyroid ultrasound recommended  Follow-up with primary care    Suspected RG  - Outpatient sleep study referral given at discharge      Disposition: Discharge    Follow-up after Discharge: Neurosurgery, neurology, ophthalmology, sleep study, primary care    This note will serve as a discharge summary    Duane Matta III, PA 06/25/25 08:13 EDT    I have worn appropriate PPE during this patient encounter, sanitized my hands both with entering and exiting patient's room.      30 minutes has been spent by [facility] providers in the care of this patient while under observation status on this date 06/25/25

## 2025-06-25 NOTE — CONSULTS
"Neurology Consult Note  Consult Date: 6/25/2025  Referring MD: David Hawkins MD  Reason for Consult I have been asked to see the patient in neurological consultation to render advice and opinion regarding headache.     Leah Nova is a 65 y.o. female with past medical history of chronic neck and back pain status post multiple spinal procedures including a spinal  cord stimulator that has been explanted, tobacco dependency, hyperlipidemia, anxiety with panic attacks, self-reported abdominal aortic aneurysm which her daughter reports is currently under surveillance who presented to the hospital on 6/24/2025 with complaints of an intractable headache.  She states that her headache started about a week and a half ago where she developed a right frontotemporal headache described as a sharp shooting and throbbing pain that she could feel behind her eye and on the side of her head.  She had associated photophobia and nausea.  A few days after her headache started she noticed blurred vision of both eyes.  She describes seeing a kaleidoscope type vision, occasional \"halo lights\", and black floaters associated.  At home she was taking Babs aspirin, extra strength Tylenol, and Excedrin Migraine tablets with no relief.  She did present to an urgent care a few days prior to this admission and underwent a CT head.  She was told that she might have sinusitis and was started on antibiotics.  She denies any respiratory symptoms or recent illnesses.  She denies any history of migraine headaches but does at times gets \"regular headaches\".  Typically she will take 2 Babs aspirin to get rid of it.  She has been under a great deal of stress recently and reports significant trouble with insomnia.  She describes waking up gasping for air multiple times a night and does snore.  She has never had a sleep study.    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  A CTA of her head neck was also done and showed an incidental left ophthalmic aneurysm for which primary is consulted neurosurgery to evaluate.  They are planning on a LP today to look for xanthochromia.  She has normal inflammatory markers.  She has received Benadryl 25 mg IV x 2, droperidol 2.5 mg IV x 1, Toradol 15 mg and 30 mg IV x 1, magnesium sulfate 2 g and 1 g x 1, morphine 4 mg x 2, and Compazine 10 mg x 2.    Past Medical/Surgical Hx:  Past Medical History:   Diagnosis Date    Cervical cancer     years ago    Chronic pain     WITH NECK BACK AND LEGS    COPD (chronic obstructive pulmonary disease)     Depression     GERD (gastroesophageal reflux disease)     History of panic attacks     Hyperlipidemia     Injury of back     Skin cancer     BCC REMOVED FROM FACE ABOVE LIP. SQUAMOUS CELL REMOVED RIGHT LEG    Umbilical hernia      Past Surgical History:   Procedure Laterality Date    ANTERIOR CERVICAL DISCECTOMY W/ FUSION      X2    APPENDECTOMY      BLADDER SUSPENSION      COLONOSCOPY N/A 05/06/2024    Procedure: COLONOSCOPY;  Surgeon: Han Lawler MD;  [place] [facility] [place]  Service: Gastroenterology;  Laterality: N/A;  POLYPS, INTERNAL HEMORRHOIDS    ENDOSCOPY N/A 05/06/2024    Procedure: ESOPHAGOGASTRODUODENOSCOPY;  Surgeon: Han Lawler MD;  [place] [facility] [place]  Service: Gastroenterology;  Laterality: N/A;  GASTRITIS    HYSTERECTOMY      LUMBAR DISCECTOMY FUSION INSTRUMENTATION      SPINAL CORD STIMULATOR IMPLANT      HAS SINCE BEEN REMOVED    TONSILLECTOMY      TUBAL ABDOMINAL LIGATION      UMBILICAL HERNIA REPAIR N/A 09/06/2024    Procedure: UMBILICAL HERNIA REPAIR, with mesh placement;  Surgeon: Bradley Purcell MD;  Location[facility] MAIN OR;  Service: General;  Laterality: N/A;     Medications On Admission  Medications Prior to Admission   Medication Sig Dispense Refill Last Dose/Taking    diphenhydrAMINE-acetaminophen (TYLENOL PM)  MG tablet per tablet Take 2  tablets by mouth At Night As Needed for Sleep.   6/23/2025 Bedtime    Ibsrela 50 MG tablet TAKE 1 TABLET BY MOUTH TWICE A DAY IMMEDIATELY BEFORE MEALS   6/23/2025 Evening    omeprazole (priLOSEC) 40 MG capsule Take 1 capsule by mouth 2 (Two) Times a Day Before Meals. (Patient taking differently: Take 1 capsule by mouth 2 (Two) Times a Day.) 180 capsule 3 6/23/2025 Morning    rosuvastatin (CRESTOR) 10 MG tablet Take 1 tablet by mouth Every Night.   6/23/2025 Bedtime    albuterol (PROVENTIL HFA;VENTOLIN HFA) 108 (90 Base) MCG/ACT inhaler Inhale 2 puffs Every 4 (Four) Hours As Needed.   Unknown    albuterol (PROVENTIL) (2.5 MG/3ML) 0.083% nebulizer solution Take 2.5 mg by nebulization Every 4 (Four) Hours As Needed for Wheezing or Shortness of Air.   Unknown    Mirabegron ER (Myrbetriq) 50 MG tablet sustained-release 24 hour 24 hr tablet Take 50 mg by mouth Daily. WILL START OF SCRIPT FOR TROSPLUM RUNS OUT   Unknown     Allergies:  Allergies   Allergen Reactions    Prozac [Fluoxetine] Other (See Comments)     Homicidal, depressed, confusion     Social Hx:  Social History     Socioeconomic History    Marital status: Single   Tobacco Use    Smoking status: Every Day     Current packs/day: 0.50     Average packs/day: 0.5 packs/day for 0.2 years (0.1 ttl pk-yrs)     Types: Cigarettes     Start date: 05/2025     Passive exposure: Current (former smoker quit in April, had a life event that made her start smoking again.)    Smokeless tobacco: Never    Tobacco comments:     39 YEAR 1/2 PPD SMOKER   Vaping Use    Vaping status: Never Used   Substance and Sexual Activity    Alcohol use: No    Drug use: No    Sexual activity: Defer     Family Hx:  Family History   Problem Relation Age of Onset    Malig Hyperthermia Neg Hx      Review of Systems   Eyes:  Positive for photophobia and visual disturbance.   Neurological:  Positive for headaches.     Exam  /66 (BP Location: Left arm, Patient Position: Lying)   Pulse 70   Temp  "97.9 °F (36.6 °C) (Oral)   Resp 18   Ht 152.4 cm (60\")   Wt 79.4 kg (175 lb)   SpO2 98%   BMI 34.18 kg/m²     Current Facility-Administered Medications:     albuterol (PROVENTIL) nebulizer solution 0.083% 2.5 mg/3mL, 2.5 mg, Nebulization, Q6H PRN, Blevens, Bria C, APRN    Calcium Replacement - Follow Nurse / BPA Driven Protocol, , Not Applicable, PRN, Blevens, Bria C, APRN    diphenhydrAMINE (BENADRYL) injection 25 mg, 25 mg, Intravenous, Q6H PRN, Blevens, Bria C, APRN    Magnesium Standard Dose Replacement - Follow Nurse / BPA Driven Protocol, , Not Applicable, PRN, Blevens, Bria C, APRN    nitroglycerin (NITROSTAT) SL tablet 0.4 mg, 0.4 mg, Sublingual, Q5 Min PRN, Blevens, Bria C, APRN    pantoprazole (PROTONIX) EC tablet 40 mg, 40 mg, Oral, Q AM, Blevens, Bria C, APRN, 40 mg at 06/25/25 0500    Phosphorus Replacement - Follow Nurse / BPA Driven Protocol, , Not Applicable, PRN, Blevens, Bria C, APRN    Potassium Replacement - Follow Nurse / BPA Driven Protocol, , Not Applicable, PRN, Blevens, Bria C, APRN    prochlorperazine (COMPAZINE) injection 10 mg, 10 mg, Intravenous, Q6H PRN, Blevens, Bria C, APRN    rosuvastatin (CRESTOR) tablet 10 mg, 10 mg, Oral, Nightly, Blevens, Bria C, APRN, 10 mg at 06/24/25 2106    sodium chloride 0.9 % flush 10 mL, 10 mL, Intravenous, Q12H, Blevens, Bria C, APRN, 10 mL at 06/25/25 0839    sodium chloride 0.9 % flush 10 mL, 10 mL, Intravenous, PRN, Blevens, Bria C, APRN    sodium chloride 0.9 % infusion 40 mL, 40 mL, Intravenous, PRN, Blevens, Bria C, APRN    PRN meds    albuterol    Calcium Replacement - Follow Nurse / BPA Driven Protocol    diphenhydrAMINE    Magnesium Standard Dose Replacement - Follow Nurse / BPA Driven Protocol    nitroglycerin    Phosphorus Replacement - Follow Nurse / BPA Driven Protocol    Potassium Replacement - Follow Nurse / BPA Driven Protocol    prochlorperazine    sodium chloride    sodium chloride    No current facility-administered medications on " file prior to encounter.     Current Outpatient Medications on File Prior to Encounter   Medication Sig    diphenhydrAMINE-acetaminophen (TYLENOL PM)  MG tablet per tablet Take 2 tablets by mouth At Night As Needed for Sleep.    Ibsrela 50 MG tablet TAKE 1 TABLET BY MOUTH TWICE A DAY IMMEDIATELY BEFORE MEALS    omeprazole (priLOSEC) 40 MG capsule Take 1 capsule by mouth 2 (Two) Times a Day Before Meals. (Patient taking differently: Take 1 capsule by mouth 2 (Two) Times a Day.)    rosuvastatin (CRESTOR) 10 MG tablet Take 1 tablet by mouth Every Night.    albuterol (PROVENTIL HFA;VENTOLIN HFA) 108 (90 Base) MCG/ACT inhaler Inhale 2 puffs Every 4 (Four) Hours As Needed.    albuterol (PROVENTIL) (2.5 MG/3ML) 0.083% nebulizer solution Take 2.5 mg by nebulization Every 4 (Four) Hours As Needed for Wheezing or Shortness of Air.    Mirabegron ER (Myrbetriq) 50 MG tablet sustained-release 24 hour 24 hr tablet Take 50 mg by mouth Daily. WILL START OF SCRIPT FOR TROMARÍA ELENALUM RUNS OUT       General appearance: Obese female, NAD, alert and cooperative, well groomed  HEENT: Normocephalic, atraumatic, diffuse whole head tenderness to touch    Neurological:   MS: oriented x3, recent/remote memory intact, normal attention/concentration, language intact, no neglect, normal fund of knowledge  CN: visual fields full, EOMI, facial sensation equal, no facial droop, shoulder shrug equal, tongue midline  Motor: 5/5 in all 4 ext.  Sensory: light touch and cold sensation intact in all 4 ext.  Coordination: Normal finger to nose test      Laboratory results:  Lab Results   Component Value Date    GLUCOSE 94 06/24/2025    CALCIUM 9.4 06/24/2025     06/24/2025    K 4.4 06/24/2025    CO2 27.4 06/24/2025     06/24/2025    BUN 4.0 (L) 06/24/2025    CREATININE 0.79 06/24/2025    EGFRIFAFRI >60 09/01/2022    EGFRIFNONA 77 11/27/2018    BCR 5.1 (L) 06/24/2025    ANIONGAP 10.6 06/24/2025     Lab Results   Component Value Date    WBC 8.27  "06/24/2025    HGB 14.0 06/24/2025    HCT 43.2 06/24/2025    MCV 94.3 06/24/2025     06/24/2025     No results found for: \"CHOL\"  Lab Results   Component Value Date    HDL 36 (L) 09/05/2019    HDL 43 (L) 10/16/2018     Lab Results   Component Value Date    LDL UNABLE TO CALCULATE 09/05/2019     (H) 10/16/2018     Lab Results   Component Value Date    TRIG 426 (H) 09/05/2019    TRIG 351 (H) 10/16/2018     Lab Results   Component Value Date    HGBA1C 5.8 (H) 06/17/2024     No results found for: \"INR\", \"PROTIME\"  Lab Results   Component Value Date    [id number] 407 10/25/2021     Lab Results   Component Value Date    TSH 1.810 09/10/2020     Pain Management Panel           No data to display               Brief Urine Lab Results  (Last result in the past 365 days)        Color   Clarity   Blood   Leuk Est   Nitrite   Protein   CREAT   Urine HCG        06/24/25 1517 Yellow   Clear   Moderate (2+)   Negative   Negative   Negative                   Lab review: I personally reviewed all labs as documented above.    Imaging review:   CT Angiogram Neck  Result Date: 6/25/2025  1.  There is no evidence of acute infarction or of intracranial hemorrhage. Further evaluation could be performed with MRI examination of the brain. 2.  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 The right A1 segment is hypoplastic. There is prominence of the anterior communicating artery superiorly. This may be due to a small infundibulum although a small (1 mm) aneurysm cannot be excluded. 3.  There is nodular prominence of the inferior aspect of the left lobe of the thyroid gland, obscured by beam hardening artifact from the patient's shoulders. A thyroid ultrasound is recommended. The above information was called and discussed with Dr. Quinones.  Radiation dose reduction techniques were utilized, including automated exposure control and exposure modulation based on body size.    This report was finalized on 6/25/2025 7:09 AM " by Dr. Suhas Manuel M.D on Workstation: [id number]      MRI Brain With & Without Contrast  Result Date: 6/25/2025  1. No acute intracranial abnormality.  No abnormal enhancement.   This report was finalized on 6/25/2025 1:52 AM by Dr. Carmencita Oro M.D on Workstation: [id number]        I personally reviewed images with Dr. Cradoza, and he agrees with radiology report.    Diagnosis:  Acute headache with visual disturbance, suspect migraine headache with aura  At risk for obstructive sleep apnea  Left ophthalmic aneurysm  Thyroid nodule  Generalized anxiety    Comment: 65-year-old female who presented with complaints of an intractable headache with visual disturbances.  Reviewed her MRI brain and CTA of the head and neck which both essentially look okay other than this incidental left ophthalmic aneurysm noted which neurosurgery has ordered an LP to be performed to look for xanthochromia.  She describes worsening pain of her right frontal temporal area but also has diffuse head pain and is tender to touch of her entire head.  Her inflammatory markers are normal.  Suspect her symptoms are secondary to migraine headache with aura with rebound component given the amount of OTC medications she was taking at home.  I also suspect she has untreated severe sleep apnea which is also contributing to her headaches on top of her recent increased stress.  I am going to place a referral to sleep medicine.  She needs a follow-up with her primary about her ongoing anxiety.  She has had some improvement with the migraine cocktails.  Will go ahead and order 1 more dose of this and then have her start mag oxide and B2 daily for preventative treatment.  If she has not had a vision exam recently I would recommend she follow-up with an ophthalmologist as well.    PLAN:   - Can give 1 more dose of Benadryl 25mg IV and Compazine 10mg IV now  - Start Mg oxide 400mg PO and Riboflavin 400mg PO daily  - Sleep medicine referral for  sleep study   - F/U ophthalmology if no recent visual exam in the last 6 months. with   - Needs to abstain from all smoking products   - F/U with neurosurgery for ophthalmic aneurysm  - Defer to primary to address further evaluation of thyroid nodule  - She can f/u with us in the outpatient setting if H/A aren't improved with Mg ox and B2.  We will sign off and see again per request.    Case discussed with patient, daughter at bedside, ISHA Putnam, and Dr. Cardoza and he agrees with plan above.    BEVERLY Almaraz

## 2025-06-25 NOTE — PLAN OF CARE
Problem: Adult Inpatient Plan of Care  Goal: Plan of Care Review  Outcome: Progressing  Flowsheets  Taken 6/25/2025 0031 by Luc Hatch RN  Progress: no change  Taken 6/24/2025 1821 by Jose Knox RN  Outcome Evaluation: Pt. admitted today for intractable headache. Awaiting MRI of head, neurology is following. At this time VSS, pt. A&Ox4, and all questions answered.  Goal: Patient-Specific Goal (Individualized)  Outcome: Progressing  Goal: Absence of Hospital-Acquired Illness or Injury  Outcome: Progressing  Intervention: Identify and Manage Fall Risk  Recent Flowsheet Documentation  Taken 6/25/2025 0000 by Luc Hatch RN  Safety Promotion/Fall Prevention:   activity supervised   clutter free environment maintained   fall prevention program maintained   nonskid shoes/slippers when out of bed   safety round/check completed   room organization consistent  Taken 6/24/2025 2200 by Luc Hatch RN  Safety Promotion/Fall Prevention:   activity supervised   clutter free environment maintained   fall prevention program maintained   nonskid shoes/slippers when out of bed   safety round/check completed   room organization consistent  Taken 6/24/2025 1930 by Luc Hatch RN  Safety Promotion/Fall Prevention:   safety round/check completed   activity supervised  Intervention: Prevent Skin Injury  Recent Flowsheet Documentation  Taken 6/24/2025 1930 by Luc Hatch RN  Body Position: position changed independently  Intervention: Prevent and Manage VTE (Venous Thromboembolism) Risk  Recent Flowsheet Documentation  Taken 6/24/2025 1930 by Luc Hatch RN  VTE Prevention/Management: patient refused intervention  Intervention: Prevent Infection  Recent Flowsheet Documentation  Taken 6/25/2025 0000 by Luc Hatch RN  Infection Prevention:   rest/sleep promoted   hand hygiene promoted   single patient room provided   personal protective equipment utilized  Taken 6/24/2025 2200 by Luc Hatch RN  Infection Prevention:    rest/sleep promoted   hand hygiene promoted   single patient room provided   personal protective equipment utilized  Taken 6/24/2025 1930 by Luc Hatch RN  Infection Prevention:   single patient room provided   visitors restricted/screened  Goal: Optimal Comfort and Wellbeing  Outcome: Progressing  Intervention: Monitor Pain and Promote Comfort  Recent Flowsheet Documentation  Taken 6/25/2025 0000 by Luc Hatch RN  Pain Management Interventions:   relaxation techniques promoted   quiet environment facilitated  Taken 6/24/2025 1930 by Luc Hatch RN  Pain Management Interventions:   relaxation techniques promoted   quiet environment facilitated   care clustered  Intervention: Provide Person-Centered Care  Recent Flowsheet Documentation  Taken 6/24/2025 1930 by Luc Hatch RN  Trust Relationship/Rapport:   choices provided   care explained  Goal: Readiness for Transition of Care  Outcome: Progressing     Problem: Comorbidity Management  Goal: Maintenance of Asthma Control  Outcome: Progressing  Intervention: Maintain Asthma Symptom Control  Recent Flowsheet Documentation  Taken 6/24/2025 1930 by Luc Hatch RN  Medication Review/Management: medications reviewed  Goal: Blood Pressure in Desired Range  Outcome: Progressing  Intervention: Maintain Blood Pressure Management  Recent Flowsheet Documentation  Taken 6/24/2025 1930 by Luc Hatch RN  Medication Review/Management: medications reviewed     Problem: Fall Injury Risk  Goal: Absence of Fall and Fall-Related Injury  Outcome: Progressing  Intervention: Identify and Manage Contributors  Recent Flowsheet Documentation  Taken 6/24/2025 1930 by Luc Hatch RN  Medication Review/Management: medications reviewed  Intervention: Promote Injury-Free Environment  Recent Flowsheet Documentation  Taken 6/25/2025 0000 by Luc Hatch RN  Safety Promotion/Fall Prevention:   activity supervised   clutter free environment maintained   fall prevention program maintained    nonskid shoes/slippers when out of bed   safety round/check completed   room organization consistent  Taken 6/24/2025 2200 by Luc Hatch, RN  Safety Promotion/Fall Prevention:   activity supervised   clutter free environment maintained   fall prevention program maintained   nonskid shoes/slippers when out of bed   safety round/check completed   room organization consistent  Taken 6/24/2025 1930 by Luc Hatch, RN  Safety Promotion/Fall Prevention:   safety round/check completed   activity supervised     Problem: Pain Acute  Goal: Optimal Pain Control and Function  Outcome: Progressing  Intervention: Optimize Psychosocial Wellbeing  Recent Flowsheet Documentation  Taken 6/24/2025 1930 by Luc Hatch, RN  Diversional Activities: television  Intervention: Develop Pain Management Plan  Recent Flowsheet Documentation  Taken 6/25/2025 0000 by Luc Hatch, RN  Pain Management Interventions:   relaxation techniques promoted   quiet environment facilitated  Taken 6/24/2025 1930 by Luc Hatch, RN  Pain Management Interventions:   relaxation techniques promoted   quiet environment facilitated   care clustered  Intervention: Prevent or Manage Pain  Recent Flowsheet Documentation  Taken 6/24/2025 1930 by Luc Hatch, RN  Medication Review/Management: medications reviewed   Goal Outcome Evaluation:           Progress: no change

## 2025-06-25 NOTE — DISCHARGE INSTRUCTIONS
In regards to the left ophthalmic aneurysm, neurosurgery would like to follow-up with you in 3 months time in order to continue to follow the aneurysm and ensure remains stable..  If you have not heard from neurosurgery over the course of the next 2 weeks call the number given at discharge.      Neurology recommends starting magnesium oxide 400 mg and riboflavin 4 mg daily to prevent headaches from occurring.  They also recommend following up for sleep study to ensure you do not have obstructive sleep apnea which can lead to headaches, elevated blood pressure, strokes and heart attacks.  A referral has been placed at discharge and staff should reach out to you to help you get this set up.  Neurology also recommends following up with ophthalmology on an outpatient basis to have any further ocular evaluation.  I will give you a referral at discharge but in this case he will have to call and set the appointment when it is convenient for you.  I will call and set up the next available appointment.    You are also noted to have a thyroid nodule.  This needs to be followed by primary care.  They will likely want to set up an ultrasound on an outpatient basis.  It may be a good idea just to see the primary care doctor in a few weeks time so they can help manage all of these referrals and ensure nothing gets missed.  They can also schedule an ultrasound of the thyroid at that time.     If your headaches persist despite starting the magnesium oxide and riboflavin follow-up with neurology on an outpatient basis and return to the ER should they be intractable or should you have any further concerns.

## 2025-06-26 NOTE — CASE MANAGEMENT/SOCIAL WORK
Case Management Discharge Note      Final Note: Home via private vehicle         Selected Continued Care - Discharged on 6/25/2025 Admission date: 6/24/2025 - Discharge disposition: Home or Self Care      Destination    No services have been selected for the patient.                Durable Medical Equipment    No services have been selected for the patient.                Dialysis/Infusion    No services have been selected for the patient.                Home Medical Care    No services have been selected for the patient.                Therapy    No services have been selected for the patient.                Community Resources    No services have been selected for the patient.                Community & DME    No services have been selected for the patient.                    Transportation Services  Transportation: Private Transportation  Private: Car    Final Discharge Disposition Code: 01 - home or self-care

## (undated) DEVICE — Device

## (undated) DEVICE — GOWN ISOL W/THUMB UNIV BLU BX/15

## (undated) DEVICE — VIAL FORMLN CAP 10PCT 20ML

## (undated) DEVICE — THE SINGLE USE ETRAP – POLYP TRAP IS USED FOR SUCTION RETRIEVAL OF ENDOSCOPICALLY REMOVED POLYPS.: Brand: ETRAP

## (undated) DEVICE — APPL CHLORAPREP HI/LITE 26ML ORNG

## (undated) DEVICE — MSK ENDO PORT O2 POM ELITE CURAPLEX A/

## (undated) DEVICE — GOWN SURG ENDOARMOR LVL3 UNIV KNT/CUF DISP NS

## (undated) DEVICE — FLEX ADVANTAGE 1500CC: Brand: FLEX ADVANTAGE

## (undated) DEVICE — SUT ETHIB 0/0 MO6 I8IN CX45D

## (undated) DEVICE — STERILE COTTON BALLS LARGE 5/P: Brand: MEDLINE

## (undated) DEVICE — KT ORCA ORCAPOD DISP STRL

## (undated) DEVICE — BITEBLOCK OMNI BLOC

## (undated) DEVICE — GLV SURG BIOGEL LTX PF 8 1/2

## (undated) DEVICE — ADAPT CLN SCPE ENDO PORPOISE BX/50 DISP

## (undated) DEVICE — SINGLE-USE BIOPSY FORCEPS: Brand: RADIAL JAW 4

## (undated) DEVICE — SYR LL TP 10ML STRL

## (undated) DEVICE — PATIENT RETURN ELECTRODE, SINGLE-USE, CONTACT QUALITY MONITORING, ADULT, WITH 9FT CORD, FOR PATIENTS WEIGING OVER 33LBS. (15KG): Brand: MEGADYNE

## (undated) DEVICE — LOU MINOR PROCEDURE: Brand: MEDLINE INDUSTRIES, INC.

## (undated) DEVICE — ANTIBACTERIAL UNDYED BRAIDED (POLYGLACTIN 910), SYNTHETIC ABSORBABLE SUTURE: Brand: COATED VICRYL

## (undated) DEVICE — CANN O2 ETCO2 FITS ALL CONN CO2 SMPL A/ 7IN DISP LF

## (undated) DEVICE — DRSNG SURESITE WNDW 4X4.5

## (undated) DEVICE — STRIP,CLOSURE,WOUND,MEDI-STRIP,1/2X4: Brand: MEDLINE

## (undated) DEVICE — LASSO POLYPECTOMY SNARE: Brand: LASSO

## (undated) DEVICE — SUT MNCRYL PLS ANTIB UD 4/0 PS2 18IN